# Patient Record
Sex: MALE | Race: WHITE | Employment: OTHER | URBAN - METROPOLITAN AREA
[De-identification: names, ages, dates, MRNs, and addresses within clinical notes are randomized per-mention and may not be internally consistent; named-entity substitution may affect disease eponyms.]

---

## 2018-04-13 ENCOUNTER — NURSE ONLY (OUTPATIENT)
Dept: WOUND CARE | Facility: HOSPITAL | Age: 83
End: 2018-04-13
Attending: NURSE PRACTITIONER
Payer: MEDICARE

## 2018-04-13 DIAGNOSIS — L97.421 ULCER OF LEFT HEEL, LIMITED TO BREAKDOWN OF SKIN (HCC): ICD-10-CM

## 2018-04-13 DIAGNOSIS — E11.621 ULCER OF FOOT DUE TO DIABETES MELLITUS (HCC): Primary | ICD-10-CM

## 2018-04-13 DIAGNOSIS — L97.401 DIABETIC ULCER OF MIDFOOT ASSOCIATED WITH DIABETES MELLITUS DUE TO UNDERLYING CONDITION, LIMITED TO BREAKDOWN OF SKIN, UNSPECIFIED LATERALITY (HCC): ICD-10-CM

## 2018-04-13 DIAGNOSIS — L97.411 ULCER OF RIGHT HEEL, LIMITED TO BREAKDOWN OF SKIN (HCC): ICD-10-CM

## 2018-04-13 DIAGNOSIS — E08.621 DIABETIC ULCER OF MIDFOOT ASSOCIATED WITH DIABETES MELLITUS DUE TO UNDERLYING CONDITION, LIMITED TO BREAKDOWN OF SKIN, UNSPECIFIED LATERALITY (HCC): ICD-10-CM

## 2018-04-13 DIAGNOSIS — L97.509 ULCER OF FOOT DUE TO DIABETES MELLITUS (HCC): Primary | ICD-10-CM

## 2018-04-13 PROCEDURE — 97597 DBRDMT OPN WND 1ST 20 CM/<: CPT

## 2018-04-13 PROCEDURE — 97597 DBRDMT OPN WND 1ST 20 CM/<: CPT | Performed by: NURSE PRACTITIONER

## 2018-04-13 PROCEDURE — 99204 OFFICE O/P NEW MOD 45 MIN: CPT | Performed by: NURSE PRACTITIONER

## 2018-04-13 NOTE — PROGRESS NOTES
Subjective    Chief Complaint  This information was obtained from the patient  The patient is new to the [address] [facility],[address] here for an initial visit for the evaluation and management of non-healing wound(s).     Allergies  None    HPI  This information was Avery Martin Constitutional Symptoms (General Health): Chills, Fatigue, Fever, Loss of Appetite  Respiratory: Cough, Shortness of Breath  Cardiovascular (Central/Peripheral): Dyspnea on Exertion  Integumentary (Hair/Skin/Nails): Calluses/Corns    Additional Information ascorbic acid (vitamin C) 500 mg tablet oral tablet oral once daily        Objective    Constitutional  obese. well developed, well nourished, no acute distress.  Height/Length: 65 in (165.1 cm), Weight: 207 lbs (94.09 kgs), BMI: 34.4, Temperature: 97.2 °F Wound #2 Left, Lateral Ankle is an acute Evans Grade 1 Diabetic Ulcer and has received a status of Not Healed. Initial wound encounter measurements are 0.5cm length x 0.3cm width x 0.1cm depth, with an area of 0.15 sq cm and a volume of 0.015 cubic cm.  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 A selective debridement with a total area debrided of 2.88 sq cm was performed by IVY Lane. Dermis was removed along with devitalized tissue: biofilm and callus.  The following instrument(s) w Wound #3 (Diabetic Ulcer) is located on the right heel. A selective debridement with a total area debrided of 5.76 sq cm was performed by Francois Gomez RN. Dermis was removed along with devitalized tissue: biofilm and callus.  The following instrument(s) A follow-up appointment should be scheduled.     Electronic Signature(s)  Signed By: Date:  Buck Mcmillan FNP-BC 04/13/2018 5:18:03 PM     4/13/2018 11:59:55 AM Version Signed By: Date:  Ambar Gonzalez 4/13/2018 12:00:11 PM    Entered By: Buck Mcmillan on 0 Compression used: using Artiflex 10 cm (1), Artiflex 15 cm (1), Comprilan 08 cm (1), Comprilan 10 cm (1)  Wound #3 (Right Heel)  . Wound Treatment Note  Assessed patient’s pain status and effectiveness of pain management plan.   Limb cleansed using Betasept Performed By: Clinician Blanche Nettles RN  Debridement: Selective  Photos  Photo  Time-Out Taken: Yes  Post Debridement Measurements  Length: (cm) 0.5  Width: (cm) 0.3  Depth: (cm) 0.1      Area: (cm²) 0.15  Percent Debrided: 100  Total Area Debrided: (sq

## 2018-04-17 ENCOUNTER — HOSPITAL ENCOUNTER (OUTPATIENT)
Dept: GENERAL RADIOLOGY | Facility: HOSPITAL | Age: 83
Discharge: HOME | End: 2018-04-17
Attending: NURSE PRACTITIONER
Payer: MEDICARE

## 2018-04-17 ENCOUNTER — NURSE ONLY (OUTPATIENT)
Dept: WOUND CARE | Facility: HOSPITAL | Age: 83
End: 2018-04-17
Attending: NURSE PRACTITIONER
Payer: MEDICARE

## 2018-04-17 DIAGNOSIS — S91.302D OPEN WOUND OF LEFT FOOT EXCLUDING ONE OR MORE TOES, SUBSEQUENT ENCOUNTER: ICD-10-CM

## 2018-04-17 DIAGNOSIS — S91.302D OPEN WOUND OF LEFT FOOT EXCLUDING ONE OR MORE TOES, SUBSEQUENT ENCOUNTER: Primary | ICD-10-CM

## 2018-04-17 PROBLEM — S91.302A: Status: ACTIVE | Noted: 2018-04-17

## 2018-04-17 PROCEDURE — 73630 X-RAY EXAM OF FOOT: CPT | Performed by: NURSE PRACTITIONER

## 2018-04-17 PROCEDURE — 99212 OFFICE O/P EST SF 10 MIN: CPT | Performed by: NURSE PRACTITIONER

## 2018-04-17 PROCEDURE — 99212 OFFICE O/P EST SF 10 MIN: CPT

## 2018-04-17 NOTE — PROGRESS NOTES
x-ray result discussed with patient and faxed to primary office 4/17/2018. PROCEDURE:  XR FOOT, COMPLETE (MIN 3 VIEWS), LEFT (CPT=73630)     COMPARISON: None. INDICATIONS:  Unspecified open wound, of left foot.      TECHNIQUE:     3 views were obtained

## 2018-04-17 NOTE — PROGRESS NOTES
Header Image    Progress Note Details  Patient Name: Rick Franco  Patient Number: [id number]  Patient YOB: 1935  Date: 4/17/2018  RN: Humphrey Campbell CNA/JULIANNE/CMA: Marifer Retana  Physician / Irasema Silverio: Antonina David: Outpatient Wound #1 Left Heel is an acute Diabetic Ulcer and has received an outcome of Resolved. The periwound skin exhibited: Dry/Scaly.  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 Increase dietary protein intake. Exercise as tolerated. Decrease salt intake. Moisturizer.  - Continue to use Urea 20% on the unwrapped right leg  Other: - reduced calorie, low carbohydrate diet    Follow-Up Ray

## 2018-04-20 ENCOUNTER — NURSE ONLY (OUTPATIENT)
Dept: WOUND CARE | Facility: HOSPITAL | Age: 83
End: 2018-04-20
Attending: NURSE PRACTITIONER
Payer: MEDICARE

## 2018-04-20 DIAGNOSIS — L97.411 ULCER OF RIGHT HEEL, LIMITED TO BREAKDOWN OF SKIN (HCC): Primary | ICD-10-CM

## 2018-04-20 PROCEDURE — 99212 OFFICE O/P EST SF 10 MIN: CPT

## 2018-04-24 ENCOUNTER — NURSE ONLY (OUTPATIENT)
Dept: WOUND CARE | Facility: HOSPITAL | Age: 83
End: 2018-04-24
Attending: NURSE PRACTITIONER
Payer: MEDICARE

## 2018-04-24 DIAGNOSIS — L97.411 ULCER OF RIGHT HEEL, LIMITED TO BREAKDOWN OF SKIN (HCC): Primary | ICD-10-CM

## 2018-04-24 DIAGNOSIS — L97.421 ULCER OF LEFT HEEL, LIMITED TO BREAKDOWN OF SKIN (HCC): ICD-10-CM

## 2018-04-24 PROCEDURE — 99211 OFF/OP EST MAY X REQ PHY/QHP: CPT | Performed by: NURSE PRACTITIONER

## 2018-04-24 PROCEDURE — 99211 OFF/OP EST MAY X REQ PHY/QHP: CPT

## 2018-04-24 NOTE — PROGRESS NOTES
Chief Complaint  This information was obtained from the patient  The patient is new to the [address] [facility],[address] here for an initial visit for the evaluation and management of non-healing wound(s). \  4/24/18 no concerns for today visit    Allergies  None    HPI  Th skin  S91.002A: Unspecified open wound, left ankle, initial encounter        Patient was seen by PCP at [place] for left 2nd toe erythema. Patient's bilateral heel wounds remain  healed.  Patient has complain of lost of sensation and never pain on the hands and stocking, knee high (1)  Notes  callex cream  Wound #3 (Right Heel)  . Wound Treatment Note  Assessed patient’s pain status and effectiveness of pain management plan.   Limb cleansed using Betasept and water (1)  Cleansed wound and periwound with non-cytotox

## 2018-04-24 NOTE — PROGRESS NOTES
Chief Complaint  This information was obtained from the patient  The patient is new to the [address] [facility],[address] here for an initial visit for the evaluation and management of non-healing wound(s).     Allergies  None    HPI  This information was obtained from the p Constitutional Symptoms (General Health): Chills, Fatigue, Fever, Loss of Appetite  Respiratory: Cough, Shortness of Breath  Cardiovascular (Central/Peripheral): Dyspnea on Exertion  Integumentary (Hair/Skin/Nails): Calluses/Corns    Additional Information ascorbic acid (vitamin C) 500 mg tablet oral tablet oral once daily        Objective    Constitutional  obese. well developed, well nourished, no acute distress.  Height/Length: 65 in (165.1 cm), Weight: 207 lbs (94.09 kgs), BMI: 34.4, Temperature: 97.2 °F Wound #2 Left, Lateral Ankle is an acute Evans Grade 1 Diabetic Ulcer and has received a status of Not Healed. Initial wound encounter measurements are 0.5cm length x 0.3cm width x 0.1cm depth, with an area of 0.15 sq cm and a volume of 0.015 cubic cm.  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 A selective debridement with a total area debrided of 2.88 sq cm was performed by IVY Trinidad. Dermis was removed along with devitalized tissue: biofilm and callus.  The following instrument(s) w Wound #3 (Diabetic Ulcer) is located on the right heel. A selective debridement with a total area debrided of 5.76 sq cm was performed by IVY Lopez. Dermis was removed along with devitalized tissue: biofilm and callus.  The following instrument(s) A follow-up appointment should be scheduled.           Electronic Signature(s)  Signed By: Salud Nguyen 04/24/2018 6:57:11 AM     4/13/2018 5:17:49 PM Version Signed By: Brian Arroyo 4/13/2018 5:18:03 PM  Ana Laura  4/20/2018 6:59:53 AM Compression applied to: using Toe bases to tibial tubercle (1)  Compression used: using Artiflex 10 cm (1), Artiflex 15 cm (1), Comprilan 08 cm (1), Comprilan 10 cm (1)  Wound #3 (Right Heel)    . Wound Treatment Note  Assessed patient’s pain status and eff Physician / Extender: Adrienne Duque: Outpatient  Debridement Performed for Assessment: Wound #2 Left, Lateral Ankle  Performed By: Clinician IVY Carrillo  Debridement: Selective  Photos  Photo  Time-Out Taken: Yes  Post Debridement Measurem Nevaeh Paul 4/13/2018 12:00:11 PM  Fadia Chan 4/13/2018 5:18:03 PM    Entered By: Nevaeh Paul on 04/24/2018 6:56:38 AM

## 2018-04-27 ENCOUNTER — APPOINTMENT (OUTPATIENT)
Dept: WOUND CARE | Facility: HOSPITAL | Age: 83
End: 2018-04-27
Attending: NURSE PRACTITIONER
Payer: MEDICARE

## 2018-05-01 ENCOUNTER — APPOINTMENT (OUTPATIENT)
Dept: WOUND CARE | Facility: HOSPITAL | Age: 83
End: 2018-05-01
Attending: NURSE PRACTITIONER
Payer: MEDICARE

## 2018-05-04 ENCOUNTER — APPOINTMENT (OUTPATIENT)
Dept: WOUND CARE | Facility: HOSPITAL | Age: 83
End: 2018-05-04
Attending: NURSE PRACTITIONER
Payer: MEDICARE

## 2018-05-08 ENCOUNTER — APPOINTMENT (OUTPATIENT)
Dept: WOUND CARE | Facility: HOSPITAL | Age: 83
End: 2018-05-08
Attending: NURSE PRACTITIONER
Payer: MEDICARE

## 2018-10-27 ENCOUNTER — HOSPITAL ENCOUNTER (EMERGENCY)
Facility: HOSPITAL | Age: 83
Discharge: HOME | End: 2018-10-27
Attending: EMERGENCY MEDICINE
Payer: MEDICARE

## 2018-10-27 VITALS
OXYGEN SATURATION: 98 % | TEMPERATURE: 98 F | DIASTOLIC BLOOD PRESSURE: 50 MMHG | RESPIRATION RATE: 16 BRPM | HEART RATE: 69 BPM | SYSTOLIC BLOOD PRESSURE: 153 MMHG

## 2018-10-27 DIAGNOSIS — T14.8XXA SKIN AVULSION: Primary | ICD-10-CM

## 2018-10-27 DIAGNOSIS — S91.112A LACERATION OF GREAT TOE OF LEFT FOOT, FOREIGN BODY PRESENCE UNSPECIFIED, NAIL DAMAGE STATUS UNSPECIFIED, INITIAL ENCOUNTER: ICD-10-CM

## 2018-10-27 DIAGNOSIS — E10.65 TYPE 1 DIABETES MELLITUS WITH HYPERGLYCEMIA (HCC): ICD-10-CM

## 2018-10-27 PROCEDURE — 99283 EMERGENCY DEPT VISIT LOW MDM: CPT

## 2018-10-27 PROCEDURE — 82962 GLUCOSE BLOOD TEST: CPT

## 2018-10-27 RX ORDER — CEPHALEXIN 500 MG/1
500 CAPSULE ORAL 2 TIMES DAILY
Qty: 10 CAPSULE | Refills: 0 | Status: SHIPPED | OUTPATIENT
Start: 2018-10-27 | End: 2018-11-01

## 2018-10-28 NOTE — ED PROVIDER NOTES
Patient Seen in: [facility] AND [facility] Emergency Department    History   Patient presents with:  Rash Skin Problem (integumentary)    Stated Complaint: foot injury    HPI    HPI: Sylwia Harden is a 81year old male who presents after an injury to b feet   th minimal surrounding erythema, left foot great toe at the base lateral plantar surface there is a 1 cm area of ecchymosis then there is a superficial laceration just proximal to this. .  2+ distal pulses. NEURO:Sensation to touch is intact.   SKIN: Janeen Fall

## 2018-10-28 NOTE — ED NOTES
Pt has diabetes with peripheral neuropathy and was walking in stocking feet through the kitchen tonight when he noted some blood on his left sock. He has a small bruise noted on plantar surface of left great toe, with no current bleeding noted.   On the do

## 2020-01-01 ENCOUNTER — APPOINTMENT (OUTPATIENT)
Dept: ULTRASOUND IMAGING | Facility: HOSPITAL | Age: 85
DRG: 871 | End: 2020-01-01
Attending: NURSE PRACTITIONER
Payer: MEDICARE

## 2020-01-01 ENCOUNTER — APPOINTMENT (OUTPATIENT)
Dept: GENERAL RADIOLOGY | Facility: HOSPITAL | Age: 85
DRG: 871 | End: 2020-01-01
Attending: INTERNAL MEDICINE
Payer: MEDICARE

## 2020-01-01 ENCOUNTER — ANESTHESIA (OUTPATIENT)
Dept: SURGERY | Facility: HOSPITAL | Age: 85
End: 2020-01-01
Payer: MEDICARE

## 2020-01-01 ENCOUNTER — HOSPITAL ENCOUNTER (OUTPATIENT)
Facility: HOSPITAL | Age: 85
Setting detail: OBSERVATION
Discharge: HOME HEALTH | End: 2020-01-01
Attending: EMERGENCY MEDICINE | Admitting: HOSPITALIST
Payer: MEDICARE

## 2020-01-01 ENCOUNTER — HOSPITAL ENCOUNTER (INPATIENT)
Facility: HOSPITAL | Age: 85
LOS: 1 days | DRG: 871 | End: 2020-01-01
Attending: EMERGENCY MEDICINE | Admitting: HOSPITALIST
Payer: MEDICARE

## 2020-01-01 ENCOUNTER — ANESTHESIA EVENT (OUTPATIENT)
Dept: SURGERY | Facility: HOSPITAL | Age: 85
End: 2020-01-01
Payer: MEDICARE

## 2020-01-01 ENCOUNTER — APPOINTMENT (OUTPATIENT)
Dept: ULTRASOUND IMAGING | Facility: HOSPITAL | Age: 85
DRG: 871 | End: 2020-01-01
Attending: EMERGENCY MEDICINE
Payer: MEDICARE

## 2020-01-01 ENCOUNTER — HOSPITAL ENCOUNTER (EMERGENCY)
Facility: HOSPITAL | Age: 85
Discharge: HOME | End: 2020-01-01
Attending: EMERGENCY MEDICINE
Payer: MEDICARE

## 2020-01-01 ENCOUNTER — APPOINTMENT (OUTPATIENT)
Dept: GENERAL RADIOLOGY | Facility: HOSPITAL | Age: 85
DRG: 871 | End: 2020-01-01
Attending: HOSPITALIST
Payer: MEDICARE

## 2020-01-01 ENCOUNTER — APPOINTMENT (OUTPATIENT)
Dept: CV DIAGNOSTICS | Facility: HOSPITAL | Age: 85
DRG: 871 | End: 2020-01-01
Attending: INTERNAL MEDICINE
Payer: MEDICARE

## 2020-01-01 ENCOUNTER — APPOINTMENT (OUTPATIENT)
Dept: GENERAL RADIOLOGY | Facility: HOSPITAL | Age: 85
DRG: 871 | End: 2020-01-01
Attending: EMERGENCY MEDICINE
Payer: MEDICARE

## 2020-01-01 VITALS
SYSTOLIC BLOOD PRESSURE: 128 MMHG | RESPIRATION RATE: 20 BRPM | TEMPERATURE: 98 F | HEART RATE: 60 BPM | HEIGHT: 67 IN | OXYGEN SATURATION: 97 % | WEIGHT: 185 LBS | BODY MASS INDEX: 29.03 KG/M2 | DIASTOLIC BLOOD PRESSURE: 66 MMHG

## 2020-01-01 VITALS
RESPIRATION RATE: 16 BRPM | OXYGEN SATURATION: 97 % | HEIGHT: 67 IN | SYSTOLIC BLOOD PRESSURE: 194 MMHG | BODY MASS INDEX: 28.25 KG/M2 | WEIGHT: 180 LBS | HEART RATE: 67 BPM | TEMPERATURE: 99 F | DIASTOLIC BLOOD PRESSURE: 78 MMHG

## 2020-01-01 VITALS
DIASTOLIC BLOOD PRESSURE: 29 MMHG | SYSTOLIC BLOOD PRESSURE: 51 MMHG | WEIGHT: 211.88 LBS | BODY MASS INDEX: 33.26 KG/M2 | TEMPERATURE: 98 F | HEIGHT: 67 IN

## 2020-01-01 DIAGNOSIS — A41.9 SEPSIS, DUE TO UNSPECIFIED ORGANISM, UNSPECIFIED WHETHER ACUTE ORGAN DYSFUNCTION PRESENT (HCC): ICD-10-CM

## 2020-01-01 DIAGNOSIS — N17.9 AKI (ACUTE KIDNEY INJURY) (HCC): ICD-10-CM

## 2020-01-01 DIAGNOSIS — E87.5 HYPERKALEMIA: ICD-10-CM

## 2020-01-01 DIAGNOSIS — N30.00 ACUTE CYSTITIS WITHOUT HEMATURIA: Primary | ICD-10-CM

## 2020-01-01 DIAGNOSIS — R04.0 EPISTAXIS: Primary | ICD-10-CM

## 2020-01-01 DIAGNOSIS — R04.0 ACUTE POSTERIOR EPISTAXIS: Primary | ICD-10-CM

## 2020-01-01 LAB
ALBUMIN SERPL-MCNC: 2.4 G/DL (ref 3.4–5)
ALBUMIN SERPL-MCNC: 2.4 G/DL (ref 3.4–5)
ALBUMIN SERPL-MCNC: 2.6 G/DL (ref 3.4–5)
ALBUMIN SERPL-MCNC: 2.8 G/DL (ref 3.4–5)
ALBUMIN SERPL-MCNC: 2.8 G/DL (ref 3.4–5)
ALBUMIN SERPL-MCNC: 3 G/DL (ref 3.4–5)
ALBUMIN/GLOB SERPL: 0.7 {RATIO} (ref 1–2)
ALP LIVER SERPL-CCNC: 107 U/L (ref 45–117)
ALP LIVER SERPL-CCNC: 133 U/L (ref 45–117)
ALP LIVER SERPL-CCNC: 141 U/L (ref 45–117)
ALT SERPL-CCNC: 1370 U/L (ref 16–61)
ALT SERPL-CCNC: 1590 U/L (ref 16–61)
ALT SERPL-CCNC: 196 U/L (ref 16–61)
AMMONIA PLAS-MCNC: 76 UMOL/L (ref 11–32)
AMPHET UR QL SCN: NEGATIVE
ANION GAP SERPL CALC-SCNC: 11 MMOL/L (ref 0–18)
ANION GAP SERPL CALC-SCNC: 13 MMOL/L (ref 0–18)
ANION GAP SERPL CALC-SCNC: 16 MMOL/L (ref 0–18)
ANION GAP SERPL CALC-SCNC: 16 MMOL/L (ref 0–18)
ANION GAP SERPL CALC-SCNC: 18 MMOL/L (ref 0–18)
ANION GAP SERPL CALC-SCNC: 2 MMOL/L (ref 0–18)
ANION GAP SERPL CALC-SCNC: 4 MMOL/L (ref 0–18)
ANION GAP SERPL CALC-SCNC: 6 MMOL/L (ref 0–18)
ANION GAP SERPL CALC-SCNC: 7 MMOL/L (ref 0–18)
ANION GAP SERPL CALC-SCNC: 8 MMOL/L (ref 0–18)
ANION GAP SERPL CALC-SCNC: 9 MMOL/L (ref 0–18)
APTT PPP: 32.6 SECONDS (ref 23.2–35.3)
APTT PPP: 34.9 SECONDS (ref 23.2–35.3)
APTT PPP: 38.8 SECONDS (ref 23.2–35.3)
AST SERPL-CCNC: 2326 U/L (ref 15–37)
AST SERPL-CCNC: 249 U/L (ref 15–37)
AST SERPL-CCNC: 3276 U/L (ref 15–37)
BARBITURATES UR QL SCN: NEGATIVE
BASE EXCESS BLD CALC-SCNC: -10.8 MMOL/L (ref ?–2)
BASE EXCESS BLD CALC-SCNC: -15.1 MMOL/L (ref ?–2)
BASOPHILS # BLD AUTO: 0.03 X10(3) UL (ref 0–0.2)
BASOPHILS # BLD AUTO: 0.04 X10(3) UL (ref 0–0.2)
BASOPHILS # BLD AUTO: 0.05 X10(3) UL (ref 0–0.2)
BASOPHILS # BLD AUTO: 0.05 X10(3) UL (ref 0–0.2)
BASOPHILS # BLD AUTO: 0.07 X10(3) UL (ref 0–0.2)
BASOPHILS # BLD: 0 X10(3) UL (ref 0–0.2)
BASOPHILS # BLD: 0 X10(3) UL (ref 0–0.2)
BASOPHILS NFR BLD AUTO: 0.1 %
BASOPHILS NFR BLD AUTO: 0.3 %
BASOPHILS NFR BLD AUTO: 0.3 %
BASOPHILS NFR BLD AUTO: 0.4 %
BASOPHILS NFR BLD AUTO: 0.7 %
BASOPHILS NFR BLD AUTO: 0.7 %
BASOPHILS NFR BLD AUTO: 0.9 %
BASOPHILS NFR BLD: 0 %
BASOPHILS NFR BLD: 0 %
BENZODIAZ UR QL SCN: NEGATIVE
BILIRUB SERPL-MCNC: 0.5 MG/DL (ref 0.1–2)
BILIRUB SERPL-MCNC: 0.8 MG/DL (ref 0.1–2)
BILIRUB SERPL-MCNC: 0.8 MG/DL (ref 0.1–2)
BILIRUB UR QL: NEGATIVE
BUN BLD-MCNC: 19 MG/DL (ref 7–18)
BUN BLD-MCNC: 30 MG/DL (ref 7–18)
BUN BLD-MCNC: 31 MG/DL (ref 7–18)
BUN BLD-MCNC: 36 MG/DL (ref 7–18)
BUN BLD-MCNC: 43 MG/DL (ref 7–18)
BUN BLD-MCNC: 45 MG/DL (ref 7–18)
BUN BLD-MCNC: 47 MG/DL (ref 7–18)
BUN BLD-MCNC: 54 MG/DL (ref 7–18)
BUN BLD-MCNC: 56 MG/DL (ref 7–18)
BUN BLD-MCNC: 60 MG/DL (ref 7–18)
BUN BLD-MCNC: 60 MG/DL (ref 7–18)
BUN/CREAT SERPL: 15.2 (ref 10–20)
BUN/CREAT SERPL: 16 (ref 10–20)
BUN/CREAT SERPL: 16.4 (ref 10–20)
BUN/CREAT SERPL: 16.7 (ref 10–20)
BUN/CREAT SERPL: 16.7 (ref 10–20)
BUN/CREAT SERPL: 17 (ref 10–20)
BUN/CREAT SERPL: 17.3 (ref 10–20)
BUN/CREAT SERPL: 17.3 (ref 10–20)
BUN/CREAT SERPL: 23.7 (ref 10–20)
BUN/CREAT SERPL: 26.1 (ref 10–20)
BUN/CREAT SERPL: 29.8 (ref 10–20)
CA-I BLD-SCNC: 1.06 MMOL/L (ref 0.95–1.32)
CALCIUM BLD-MCNC: 7.6 MG/DL (ref 8.5–10.1)
CALCIUM BLD-MCNC: 7.7 MG/DL (ref 8.5–10.1)
CALCIUM BLD-MCNC: 7.7 MG/DL (ref 8.5–10.1)
CALCIUM BLD-MCNC: 7.9 MG/DL (ref 8.5–10.1)
CALCIUM BLD-MCNC: 8 MG/DL (ref 8.5–10.1)
CALCIUM BLD-MCNC: 8.2 MG/DL (ref 8.5–10.1)
CALCIUM BLD-MCNC: 8.3 MG/DL (ref 8.5–10.1)
CALCIUM BLD-MCNC: 8.3 MG/DL (ref 8.5–10.1)
CALCIUM BLD-MCNC: 8.6 MG/DL (ref 8.5–10.1)
CALCIUM BLD-MCNC: 8.9 MG/DL (ref 8.5–10.1)
CALCIUM BLD-MCNC: 9.2 MG/DL (ref 8.5–10.1)
CANNABINOIDS UR QL SCN: NEGATIVE
CHLORIDE SERPL-SCNC: 109 MMOL/L (ref 98–112)
CHLORIDE SERPL-SCNC: 110 MMOL/L (ref 98–112)
CHLORIDE SERPL-SCNC: 112 MMOL/L (ref 98–112)
CHLORIDE SERPL-SCNC: 113 MMOL/L (ref 98–112)
CHLORIDE SERPL-SCNC: 113 MMOL/L (ref 98–112)
CHLORIDE SERPL-SCNC: 114 MMOL/L (ref 98–112)
CHLORIDE SERPL-SCNC: 115 MMOL/L (ref 98–112)
CLARITY UR: CLEAR
CO2 SERPL-SCNC: 13 MMOL/L (ref 21–32)
CO2 SERPL-SCNC: 15 MMOL/L (ref 21–32)
CO2 SERPL-SCNC: 16 MMOL/L (ref 21–32)
CO2 SERPL-SCNC: 16 MMOL/L (ref 21–32)
CO2 SERPL-SCNC: 17 MMOL/L (ref 21–32)
CO2 SERPL-SCNC: 18 MMOL/L (ref 21–32)
CO2 SERPL-SCNC: 22 MMOL/L (ref 21–32)
CO2 SERPL-SCNC: 24 MMOL/L (ref 21–32)
CO2 SERPL-SCNC: 26 MMOL/L (ref 21–32)
CO2 SERPL-SCNC: 27 MMOL/L (ref 21–32)
CO2 SERPL-SCNC: 29 MMOL/L (ref 21–32)
COCAINE UR QL: NEGATIVE
COHGB MFR BLD: 1.7 % (ref 0–1.5)
COLOR UR: YELLOW
CREAT BLD-MCNC: 1.15 MG/DL (ref 0.7–1.3)
CREAT BLD-MCNC: 1.19 MG/DL (ref 0.7–1.3)
CREAT BLD-MCNC: 1.21 MG/DL (ref 0.7–1.3)
CREAT BLD-MCNC: 1.31 MG/DL (ref 0.7–1.3)
CREAT BLD-MCNC: 2.49 MG/DL (ref 0.7–1.3)
CREAT BLD-MCNC: 2.75 MG/DL (ref 0.7–1.3)
CREAT BLD-MCNC: 2.77 MG/DL (ref 0.7–1.3)
CREAT BLD-MCNC: 3.12 MG/DL (ref 0.7–1.3)
CREAT BLD-MCNC: 3.59 MG/DL (ref 0.7–1.3)
CREAT BLD-MCNC: 3.59 MG/DL (ref 0.7–1.3)
CREAT BLD-MCNC: 3.69 MG/DL (ref 0.7–1.3)
CREAT UR-SCNC: 208 MG/DL
DEPRECATED HBV CORE AB SER IA-ACNC: 91 NG/ML (ref 30–530)
DEPRECATED RDW RBC AUTO: 58 FL (ref 35.1–46.3)
DEPRECATED RDW RBC AUTO: 58.9 FL (ref 35.1–46.3)
DEPRECATED RDW RBC AUTO: 59.9 FL (ref 35.1–46.3)
DEPRECATED RDW RBC AUTO: 61.6 FL (ref 35.1–46.3)
DEPRECATED RDW RBC AUTO: 62.8 FL (ref 35.1–46.3)
DEPRECATED RDW RBC AUTO: 64 FL (ref 35.1–46.3)
DEPRECATED RDW RBC AUTO: 64.5 FL (ref 35.1–46.3)
EOSINOPHIL # BLD AUTO: 0 X10(3) UL (ref 0–0.7)
EOSINOPHIL # BLD AUTO: 0.03 X10(3) UL (ref 0–0.7)
EOSINOPHIL # BLD AUTO: 0.07 X10(3) UL (ref 0–0.7)
EOSINOPHIL # BLD AUTO: 0.08 X10(3) UL (ref 0–0.7)
EOSINOPHIL # BLD AUTO: 0.09 X10(3) UL (ref 0–0.7)
EOSINOPHIL # BLD AUTO: 0.17 X10(3) UL (ref 0–0.7)
EOSINOPHIL # BLD AUTO: 0.19 X10(3) UL (ref 0–0.7)
EOSINOPHIL # BLD: 0 X10(3) UL (ref 0–0.7)
EOSINOPHIL # BLD: 0 X10(3) UL (ref 0–0.7)
EOSINOPHIL NFR BLD AUTO: 0 %
EOSINOPHIL NFR BLD AUTO: 0.3 %
EOSINOPHIL NFR BLD AUTO: 0.8 %
EOSINOPHIL NFR BLD AUTO: 0.9 %
EOSINOPHIL NFR BLD AUTO: 1.1 %
EOSINOPHIL NFR BLD AUTO: 2.4 %
EOSINOPHIL NFR BLD AUTO: 2.8 %
EOSINOPHIL NFR BLD: 0 %
EOSINOPHIL NFR BLD: 0 %
ERYTHROCYTE [DISTWIDTH] IN BLOOD BY AUTOMATED COUNT: 17.1 % (ref 11–15)
ERYTHROCYTE [DISTWIDTH] IN BLOOD BY AUTOMATED COUNT: 17.2 % (ref 11–15)
ERYTHROCYTE [DISTWIDTH] IN BLOOD BY AUTOMATED COUNT: 17.2 % (ref 11–15)
ERYTHROCYTE [DISTWIDTH] IN BLOOD BY AUTOMATED COUNT: 17.3 % (ref 11–15)
ERYTHROCYTE [DISTWIDTH] IN BLOOD BY AUTOMATED COUNT: 17.4 % (ref 11–15)
ERYTHROCYTE [DISTWIDTH] IN BLOOD BY AUTOMATED COUNT: 17.6 % (ref 11–15)
ERYTHROCYTE [DISTWIDTH] IN BLOOD BY AUTOMATED COUNT: 17.7 % (ref 11–15)
EST. AVERAGE GLUCOSE BLD GHB EST-MCNC: 114 MG/DL (ref 68–126)
GLOBULIN PLAS-MCNC: 3.6 G/DL (ref 2.8–4.4)
GLOBULIN PLAS-MCNC: 4.2 G/DL (ref 2.8–4.4)
GLOBULIN PLAS-MCNC: 4.4 G/DL (ref 2.8–4.4)
GLUCOSE BLD-MCNC: 108 MG/DL (ref 70–99)
GLUCOSE BLD-MCNC: 109 MG/DL (ref 70–99)
GLUCOSE BLD-MCNC: 110 MG/DL (ref 70–99)
GLUCOSE BLD-MCNC: 114 MG/DL (ref 70–99)
GLUCOSE BLD-MCNC: 116 MG/DL (ref 70–99)
GLUCOSE BLD-MCNC: 131 MG/DL (ref 70–99)
GLUCOSE BLD-MCNC: 134 MG/DL (ref 70–99)
GLUCOSE BLD-MCNC: 44 MG/DL (ref 70–99)
GLUCOSE BLD-MCNC: 84 MG/DL (ref 70–99)
GLUCOSE BLD-MCNC: 96 MG/DL (ref 70–99)
GLUCOSE BLD-MCNC: 96 MG/DL (ref 70–99)
GLUCOSE BLDC GLUCOMTR-MCNC: 106 MG/DL (ref 70–99)
GLUCOSE BLDC GLUCOMTR-MCNC: 107 MG/DL (ref 70–99)
GLUCOSE BLDC GLUCOMTR-MCNC: 108 MG/DL (ref 70–99)
GLUCOSE BLDC GLUCOMTR-MCNC: 111 MG/DL (ref 70–99)
GLUCOSE BLDC GLUCOMTR-MCNC: 112 MG/DL (ref 70–99)
GLUCOSE BLDC GLUCOMTR-MCNC: 115 MG/DL (ref 70–99)
GLUCOSE BLDC GLUCOMTR-MCNC: 117 MG/DL (ref 70–99)
GLUCOSE BLDC GLUCOMTR-MCNC: 118 MG/DL (ref 70–99)
GLUCOSE BLDC GLUCOMTR-MCNC: 118 MG/DL (ref 70–99)
GLUCOSE BLDC GLUCOMTR-MCNC: 120 MG/DL (ref 70–99)
GLUCOSE BLDC GLUCOMTR-MCNC: 121 MG/DL (ref 70–99)
GLUCOSE BLDC GLUCOMTR-MCNC: 122 MG/DL (ref 70–99)
GLUCOSE BLDC GLUCOMTR-MCNC: 123 MG/DL (ref 70–99)
GLUCOSE BLDC GLUCOMTR-MCNC: 125 MG/DL (ref 70–99)
GLUCOSE BLDC GLUCOMTR-MCNC: 126 MG/DL (ref 70–99)
GLUCOSE BLDC GLUCOMTR-MCNC: 127 MG/DL (ref 70–99)
GLUCOSE BLDC GLUCOMTR-MCNC: 128 MG/DL (ref 70–99)
GLUCOSE BLDC GLUCOMTR-MCNC: 129 MG/DL (ref 70–99)
GLUCOSE BLDC GLUCOMTR-MCNC: 129 MG/DL (ref 70–99)
GLUCOSE BLDC GLUCOMTR-MCNC: 130 MG/DL (ref 70–99)
GLUCOSE BLDC GLUCOMTR-MCNC: 134 MG/DL (ref 70–99)
GLUCOSE BLDC GLUCOMTR-MCNC: 135 MG/DL (ref 70–99)
GLUCOSE BLDC GLUCOMTR-MCNC: 135 MG/DL (ref 70–99)
GLUCOSE BLDC GLUCOMTR-MCNC: 140 MG/DL (ref 70–99)
GLUCOSE BLDC GLUCOMTR-MCNC: 144 MG/DL (ref 70–99)
GLUCOSE BLDC GLUCOMTR-MCNC: 145 MG/DL (ref 70–99)
GLUCOSE BLDC GLUCOMTR-MCNC: 146 MG/DL (ref 70–99)
GLUCOSE BLDC GLUCOMTR-MCNC: 148 MG/DL (ref 70–99)
GLUCOSE BLDC GLUCOMTR-MCNC: 151 MG/DL (ref 70–99)
GLUCOSE BLDC GLUCOMTR-MCNC: 159 MG/DL (ref 70–99)
GLUCOSE BLDC GLUCOMTR-MCNC: 167 MG/DL (ref 70–99)
GLUCOSE BLDC GLUCOMTR-MCNC: 180 MG/DL (ref 70–99)
GLUCOSE BLDC GLUCOMTR-MCNC: 64 MG/DL (ref 70–99)
GLUCOSE BLDC GLUCOMTR-MCNC: 73 MG/DL (ref 70–99)
GLUCOSE BLDC GLUCOMTR-MCNC: 81 MG/DL (ref 70–99)
GLUCOSE BLDC GLUCOMTR-MCNC: 82 MG/DL (ref 70–99)
GLUCOSE BLDC GLUCOMTR-MCNC: 87 MG/DL (ref 70–99)
GLUCOSE BLDC GLUCOMTR-MCNC: 88 MG/DL (ref 70–99)
GLUCOSE BLDC GLUCOMTR-MCNC: 88 MG/DL (ref 70–99)
GLUCOSE BLDC GLUCOMTR-MCNC: 90 MG/DL (ref 70–99)
GLUCOSE BLDC GLUCOMTR-MCNC: 95 MG/DL (ref 70–99)
GLUCOSE BLDC GLUCOMTR-MCNC: 96 MG/DL (ref 70–99)
GLUCOSE BLDC GLUCOMTR-MCNC: 97 MG/DL (ref 70–99)
GLUCOSE UR-MCNC: NEGATIVE MG/DL
HAV IGM SER QL: 2.7 MG/DL (ref 1.6–2.6)
HBA1C MFR BLD HPLC: 5.6 % (ref ?–5.7)
HCO3 BLDA-SCNC: 13.2 MEQ/L (ref 21–27)
HCO3 BLDA-SCNC: 16.5 MEQ/L (ref 21–27)
HCT VFR BLD AUTO: 27.1 % (ref 39–53)
HCT VFR BLD AUTO: 28.6 % (ref 39–53)
HCT VFR BLD AUTO: 28.9 % (ref 39–53)
HCT VFR BLD AUTO: 29.3 % (ref 39–53)
HCT VFR BLD AUTO: 31 % (ref 39–53)
HCT VFR BLD AUTO: 31.5 % (ref 39–53)
HCT VFR BLD AUTO: 32.5 % (ref 39–53)
HCT VFR BLD AUTO: 37.5 % (ref 39–53)
HCT VFR BLD AUTO: 38.8 % (ref 39–53)
HGB BLD-MCNC: 10.1 G/DL (ref 13–17.5)
HGB BLD-MCNC: 11.6 G/DL (ref 13–17.5)
HGB BLD-MCNC: 11.9 G/DL (ref 13–17.5)
HGB BLD-MCNC: 7.8 G/DL (ref 13–17.5)
HGB BLD-MCNC: 8.6 G/DL (ref 13–17.5)
HGB BLD-MCNC: 8.7 G/DL (ref 13–17.5)
HGB BLD-MCNC: 8.8 G/DL (ref 13–17.5)
HGB BLD-MCNC: 8.9 G/DL (ref 13–17.5)
HGB BLD-MCNC: 9 G/DL (ref 13–17.5)
HGB BLD-MCNC: 9.4 G/DL (ref 13.5–17.5)
HGB BLD-MCNC: 9.5 G/DL (ref 13–17.5)
HGB BLD-MCNC: 9.7 G/DL (ref 13–17.5)
HGB UR QL STRIP.AUTO: NEGATIVE
HGB UR QL STRIP.AUTO: NEGATIVE
HYALINE CASTS #/AREA URNS AUTO: 1 /LPF
IMM GRANULOCYTES # BLD AUTO: 0.02 X10(3) UL (ref 0–1)
IMM GRANULOCYTES # BLD AUTO: 0.02 X10(3) UL (ref 0–1)
IMM GRANULOCYTES # BLD AUTO: 0.03 X10(3) UL (ref 0–1)
IMM GRANULOCYTES # BLD AUTO: 0.04 X10(3) UL (ref 0–1)
IMM GRANULOCYTES # BLD AUTO: 0.38 X10(3) UL (ref 0–1)
IMM GRANULOCYTES NFR BLD: 0.2 %
IMM GRANULOCYTES NFR BLD: 0.3 %
IMM GRANULOCYTES NFR BLD: 0.4 %
IMM GRANULOCYTES NFR BLD: 0.5 %
IMM GRANULOCYTES NFR BLD: 1.2 %
INR BLD: 1.5 (ref 0.9–1.2)
INR BLD: 1.67 (ref 0.9–1.2)
INR BLD: 4 (ref 0.9–1.2)
IRON SATURATION: 6 % (ref 20–50)
IRON SERPL-MCNC: 17 UG/DL (ref 65–175)
KETONES UR-MCNC: NEGATIVE MG/DL
LACTATE SERPL-SCNC: 1.7 MMOL/L (ref 0.4–2)
LACTATE SERPL-SCNC: 10.7 MMOL/L (ref 0.4–2)
LACTATE SERPL-SCNC: 2.7 MMOL/L (ref 0.4–2)
LACTATE SERPL-SCNC: 3.4 MMOL/L (ref 0.4–2)
LACTATE SERPL-SCNC: 7.8 MMOL/L (ref 0.4–2)
LACTATE SERPL-SCNC: 9.6 MMOL/L (ref 0.4–2)
LACTIC ACID (BLOOD GAS): 10.6 MMOL/L (ref 0.5–2.2)
LYMPHOCYTES # BLD AUTO: 0.45 X10(3) UL (ref 1–4)
LYMPHOCYTES # BLD AUTO: 0.8 X10(3) UL (ref 1–4)
LYMPHOCYTES # BLD AUTO: 0.82 X10(3) UL (ref 1–4)
LYMPHOCYTES # BLD AUTO: 0.86 X10(3) UL (ref 1–4)
LYMPHOCYTES # BLD AUTO: 0.97 X10(3) UL (ref 1–4)
LYMPHOCYTES # BLD AUTO: 1.04 X10(3) UL (ref 1–4)
LYMPHOCYTES # BLD AUTO: 1.06 X10(3) UL (ref 1–4)
LYMPHOCYTES NFR BLD AUTO: 1.4 %
LYMPHOCYTES NFR BLD AUTO: 11.2 %
LYMPHOCYTES NFR BLD AUTO: 11.9 %
LYMPHOCYTES NFR BLD AUTO: 12.1 %
LYMPHOCYTES NFR BLD AUTO: 15.2 %
LYMPHOCYTES NFR BLD AUTO: 9.1 %
LYMPHOCYTES NFR BLD AUTO: 9.2 %
LYMPHOCYTES NFR BLD: 0.22 X10(3) UL (ref 1–4)
LYMPHOCYTES NFR BLD: 0.5 X10(3) UL (ref 1–4)
LYMPHOCYTES NFR BLD: 1 %
LYMPHOCYTES NFR BLD: 2 %
M PROTEIN MFR SERPL ELPH: 6 G/DL (ref 6.4–8.2)
M PROTEIN MFR SERPL ELPH: 7 G/DL (ref 6.4–8.2)
M PROTEIN MFR SERPL ELPH: 7.4 G/DL (ref 6.4–8.2)
MCH RBC QN AUTO: 28.4 PG (ref 26–34)
MCH RBC QN AUTO: 28.6 PG (ref 26–34)
MCH RBC QN AUTO: 28.6 PG (ref 26–34)
MCH RBC QN AUTO: 28.7 PG (ref 26–34)
MCH RBC QN AUTO: 29 PG (ref 26–34)
MCH RBC QN AUTO: 29.2 PG (ref 26–34)
MCH RBC QN AUTO: 29.3 PG (ref 26–34)
MCHC RBC AUTO-ENTMCNC: 28.4 G/DL (ref 31–37)
MCHC RBC AUTO-ENTMCNC: 28.8 G/DL (ref 31–37)
MCHC RBC AUTO-ENTMCNC: 29.2 G/DL (ref 31–37)
MCHC RBC AUTO-ENTMCNC: 29.7 G/DL (ref 31–37)
MCHC RBC AUTO-ENTMCNC: 30.7 G/DL (ref 31–37)
MCHC RBC AUTO-ENTMCNC: 30.8 G/DL (ref 31–37)
MCHC RBC AUTO-ENTMCNC: 30.9 G/DL (ref 31–37)
MCHC RBC AUTO-ENTMCNC: 31.1 G/DL (ref 31–37)
MCHC RBC AUTO-ENTMCNC: 31.1 G/DL (ref 31–37)
MCV RBC AUTO: 100 FL (ref 80–100)
MCV RBC AUTO: 92.4 FL (ref 80–100)
MCV RBC AUTO: 93.5 FL (ref 80–100)
MCV RBC AUTO: 93.8 FL (ref 80–100)
MCV RBC AUTO: 94 FL (ref 80–100)
MCV RBC AUTO: 94.1 FL (ref 80–100)
MCV RBC AUTO: 95.8 FL (ref 80–100)
MCV RBC AUTO: 97.9 FL (ref 80–100)
MCV RBC AUTO: 98.5 FL (ref 80–100)
MDMA UR QL SCN: NEGATIVE
METAMYELOCYTES # BLD: 0.22 X10(3) UL
METAMYELOCYTES NFR BLD: 1 %
METHADONE UR QL SCN: NEGATIVE
METHGB MFR BLD: 0.4 % SAT (ref 0.4–1.5)
MICROALBUMIN UR-MCNC: 182 MG/DL
MICROALBUMIN/CREAT 24H UR-RTO: 875 UG/MG (ref ?–30)
MODIFIED ALLEN TEST: POSITIVE
MODIFIED ALLEN TEST: POSITIVE
MONOCYTES # BLD AUTO: 0.69 X10(3) UL (ref 0.1–1)
MONOCYTES # BLD AUTO: 0.72 X10(3) UL (ref 0.1–1)
MONOCYTES # BLD AUTO: 0.74 X10(3) UL (ref 0.1–1)
MONOCYTES # BLD AUTO: 0.81 X10(3) UL (ref 0.1–1)
MONOCYTES # BLD AUTO: 0.99 X10(3) UL (ref 0.1–1)
MONOCYTES # BLD AUTO: 1.08 X10(3) UL (ref 0.1–1)
MONOCYTES # BLD AUTO: 2.94 X10(3) UL (ref 0.1–1)
MONOCYTES # BLD: 1.31 X10(3) UL (ref 0.1–1)
MONOCYTES # BLD: 1.74 X10(3) UL (ref 0.1–1)
MONOCYTES NFR BLD AUTO: 10.3 %
MONOCYTES NFR BLD AUTO: 11 %
MONOCYTES NFR BLD AUTO: 11.1 %
MONOCYTES NFR BLD AUTO: 11.6 %
MONOCYTES NFR BLD AUTO: 8.6 %
MONOCYTES NFR BLD AUTO: 8.6 %
MONOCYTES NFR BLD AUTO: 9.3 %
MONOCYTES NFR BLD: 6 %
MONOCYTES NFR BLD: 7 %
MORPHOLOGY: NORMAL
MORPHOLOGY: NORMAL
MRSA DNA SPEC QL NAA+PROBE: NEGATIVE
NEUTROPHILS # BLD AUTO: 18.49 X10 (3) UL (ref 1.5–7.7)
NEUTROPHILS # BLD AUTO: 21.71 X10 (3) UL (ref 1.5–7.7)
NEUTROPHILS # BLD AUTO: 27.98 X10 (3) UL (ref 1.5–7.7)
NEUTROPHILS # BLD AUTO: 27.98 X10(3) UL (ref 1.5–7.7)
NEUTROPHILS # BLD AUTO: 4.91 X10 (3) UL (ref 1.5–7.7)
NEUTROPHILS # BLD AUTO: 4.91 X10(3) UL (ref 1.5–7.7)
NEUTROPHILS # BLD AUTO: 4.97 X10 (3) UL (ref 1.5–7.7)
NEUTROPHILS # BLD AUTO: 4.97 X10(3) UL (ref 1.5–7.7)
NEUTROPHILS # BLD AUTO: 6.18 X10 (3) UL (ref 1.5–7.7)
NEUTROPHILS # BLD AUTO: 6.18 X10(3) UL (ref 1.5–7.7)
NEUTROPHILS # BLD AUTO: 6.93 X10 (3) UL (ref 1.5–7.7)
NEUTROPHILS # BLD AUTO: 6.93 X10(3) UL (ref 1.5–7.7)
NEUTROPHILS # BLD AUTO: 7.03 X10 (3) UL (ref 1.5–7.7)
NEUTROPHILS # BLD AUTO: 7.03 X10(3) UL (ref 1.5–7.7)
NEUTROPHILS # BLD AUTO: 7.65 X10 (3) UL (ref 1.5–7.7)
NEUTROPHILS # BLD AUTO: 7.65 X10(3) UL (ref 1.5–7.7)
NEUTROPHILS NFR BLD AUTO: 71.1 %
NEUTROPHILS NFR BLD AUTO: 73.2 %
NEUTROPHILS NFR BLD AUTO: 75.7 %
NEUTROPHILS NFR BLD AUTO: 77.1 %
NEUTROPHILS NFR BLD AUTO: 78.1 %
NEUTROPHILS NFR BLD AUTO: 81.3 %
NEUTROPHILS NFR BLD AUTO: 88 %
NEUTROPHILS NFR BLD: 83 %
NEUTROPHILS NFR BLD: 85 %
NEUTS BAND NFR BLD: 6 %
NEUTS BAND NFR BLD: 9 %
NEUTS HYPERSEG # BLD: 20.15 X10(3) UL (ref 1.5–7.7)
NEUTS HYPERSEG # BLD: 22.57 X10(3) UL (ref 1.5–7.7)
NITRITE UR QL STRIP.AUTO: NEGATIVE
NITRITE UR QL STRIP.AUTO: NEGATIVE
NITRITE UR QL STRIP.AUTO: POSITIVE
NT-PROBNP SERPL-MCNC: 9331 PG/ML (ref ?–450)
O2 CT BLD-SCNC: 12.5 VOL% (ref 15–23)
O2 CT BLD-SCNC: 13.2 VOL% (ref 15–23)
O2/TOTAL GAS SETTING VFR VENT: 100 %
OPIATES UR QL SCN: NEGATIVE
OSMOLALITY SERPL CALC.SUM OF ELEC: 297 MOSM/KG (ref 275–295)
OSMOLALITY SERPL CALC.SUM OF ELEC: 303 MOSM/KG (ref 275–295)
OSMOLALITY SERPL CALC.SUM OF ELEC: 304 MOSM/KG (ref 275–295)
OSMOLALITY SERPL CALC.SUM OF ELEC: 306 MOSM/KG (ref 275–295)
OSMOLALITY SERPL CALC.SUM OF ELEC: 307 MOSM/KG (ref 275–295)
OSMOLALITY SERPL CALC.SUM OF ELEC: 307 MOSM/KG (ref 275–295)
OSMOLALITY SERPL CALC.SUM OF ELEC: 311 MOSM/KG (ref 275–295)
OSMOLALITY SERPL CALC.SUM OF ELEC: 319 MOSM/KG (ref 275–295)
OSMOLALITY SERPL CALC.SUM OF ELEC: 319 MOSM/KG (ref 275–295)
OXYCODONE UR QL SCN: NEGATIVE
OXYGEN LITERS/MINUTE: 2 L/MIN
PCO2 BLDA: 38 MM HG (ref 35–45)
PCO2 BLDA: 41 MM HG (ref 35–45)
PCP UR QL SCN: NEGATIVE
PEEP SETTING VENT: 5 CM H2O
PH BLDA: 7.14 [PH] (ref 7.35–7.45)
PH BLDA: 7.21 [PH] (ref 7.35–7.45)
PH UR: 5 [PH] (ref 5–8)
PHOSPHATE SERPL-MCNC: 7.3 MG/DL (ref 2.5–4.9)
PHOSPHATE SERPL-MCNC: 8.2 MG/DL (ref 2.5–4.9)
PHOSPHATE SERPL-MCNC: 8.6 MG/DL (ref 2.5–4.9)
PHOSPHATE SERPL-MCNC: 9 MG/DL (ref 2.5–4.9)
PLATELET # BLD AUTO: 171 10(3)UL (ref 150–450)
PLATELET # BLD AUTO: 172 10(3)UL (ref 150–450)
PLATELET # BLD AUTO: 188 10(3)UL (ref 150–450)
PLATELET # BLD AUTO: 197 10(3)UL (ref 150–450)
PLATELET # BLD AUTO: 198 10(3)UL (ref 150–450)
PLATELET # BLD AUTO: 211 10(3)UL (ref 150–450)
PLATELET # BLD AUTO: 242 10(3)UL (ref 150–450)
PLATELET # BLD AUTO: 246 10(3)UL (ref 150–450)
PLATELET # BLD AUTO: 292 10(3)UL (ref 150–450)
PLATELET MORPHOLOGY: NORMAL
PLATELET MORPHOLOGY: NORMAL
PO2 BLDA: 148 MM HG (ref 80–100)
PO2 BLDA: 82 MM HG (ref 80–100)
POTASSIUM (BLOOD GAS): 6.6 MMOL/L (ref 3.3–5.1)
POTASSIUM SERPL-SCNC: 3.8 MMOL/L (ref 3.5–5.1)
POTASSIUM SERPL-SCNC: 3.9 MMOL/L (ref 3.5–5.1)
POTASSIUM SERPL-SCNC: 4 MMOL/L (ref 3.5–5.1)
POTASSIUM SERPL-SCNC: 4 MMOL/L (ref 3.5–5.1)
POTASSIUM SERPL-SCNC: 5.6 MMOL/L (ref 3.5–5.1)
POTASSIUM SERPL-SCNC: 5.8 MMOL/L (ref 3.5–5.1)
POTASSIUM SERPL-SCNC: 5.8 MMOL/L (ref 3.5–5.1)
POTASSIUM SERPL-SCNC: 6.2 MMOL/L (ref 3.5–5.1)
POTASSIUM SERPL-SCNC: 6.3 MMOL/L (ref 3.5–5.1)
POTASSIUM SERPL-SCNC: 6.4 MMOL/L (ref 3.5–5.1)
POTASSIUM SERPL-SCNC: 6.4 MMOL/L (ref 3.5–5.1)
PROCALCITONIN SERPL-MCNC: 0.68 NG/ML (ref ?–0.16)
PROT UR-MCNC: 100 MG/DL
PROT UR-MCNC: 100 MG/DL
PROT UR-MCNC: 30 MG/DL
PROTHROMBIN TIME: 17.8 SECONDS (ref 11.8–14.5)
PROTHROMBIN TIME: 19.4 SECONDS (ref 11.8–14.5)
PROTHROMBIN TIME: 38.4 SECONDS (ref 11.8–14.5)
PUNCTURE CHARGE: YES
PUNCTURE CHARGE: YES
RBC # BLD AUTO: 2.75 X10(6)UL (ref 3.8–5.8)
RBC # BLD AUTO: 3.05 X10(6)UL (ref 3.8–5.8)
RBC # BLD AUTO: 3.06 X10(6)UL (ref 3.8–5.8)
RBC # BLD AUTO: 3.07 X10(6)UL (ref 3.8–5.8)
RBC # BLD AUTO: 3.1 X10(6)UL (ref 3.8–5.8)
RBC # BLD AUTO: 3.32 X10(6)UL (ref 3.8–5.8)
RBC # BLD AUTO: 3.35 X10(6)UL (ref 3.8–5.8)
RBC # BLD AUTO: 4.06 X10(6)UL (ref 3.8–5.8)
RBC # BLD AUTO: 4.15 X10(6)UL (ref 3.8–5.8)
RBC #/AREA URNS AUTO: 3 /HPF
RBC #/AREA URNS AUTO: 80 /HPF
RBC #/AREA URNS AUTO: 9 /HPF
RESP RATE: 14 BPM
SAO2 % BLDA: 96.9 % (ref 94–100)
SAO2 % BLDA: >99 % (ref 94–100)
SARS-COV-2 RNA RESP QL NAA+PROBE: NOT DETECTED
SARS-COV-2 RNA RESP QL NAA+PROBE: NOT DETECTED
SODIUM (BLOOD GAS): 141 MMOL/L (ref 135–145)
SODIUM SERPL-SCNC: 140 MMOL/L (ref 136–145)
SODIUM SERPL-SCNC: 141 MMOL/L (ref 136–145)
SODIUM SERPL-SCNC: 142 MMOL/L (ref 136–145)
SODIUM SERPL-SCNC: 143 MMOL/L (ref 136–145)
SODIUM SERPL-SCNC: 143 MMOL/L (ref 136–145)
SODIUM SERPL-SCNC: 144 MMOL/L (ref 136–145)
SODIUM SERPL-SCNC: 146 MMOL/L (ref 136–145)
SODIUM SERPL-SCNC: 146 MMOL/L (ref 136–145)
SODIUM SERPL-SCNC: 24 MMOL/L
SP GR UR STRIP: 1.01 (ref 1–1.03)
SP GR UR STRIP: 1.02 (ref 1–1.03)
SP GR UR STRIP: 1.03 (ref 1–1.03)
SPECIMEN VOL 24H UR: 500 ML
TOTAL CELLS COUNTED: 100
TOTAL CELLS COUNTED: 100
TOTAL IRON BINDING CAPACITY: 301 UG/DL (ref 240–450)
TRANSFERRIN SERPL-MCNC: 202 MG/DL (ref 200–360)
TROPONIN I SERPL-MCNC: <0.045 NG/ML (ref ?–0.04)
UROBILINOGEN UR STRIP-ACNC: <2
WBC # BLD AUTO: 21.9 X10(3) UL (ref 4–11)
WBC # BLD AUTO: 24.8 X10(3) UL (ref 4–11)
WBC # BLD AUTO: 31.8 X10(3) UL (ref 4–11)
WBC # BLD AUTO: 6.7 X10(3) UL (ref 4–11)
WBC # BLD AUTO: 7 X10(3) UL (ref 4–11)
WBC # BLD AUTO: 8 X10(3) UL (ref 4–11)
WBC # BLD AUTO: 8.9 X10(3) UL (ref 4–11)
WBC # BLD AUTO: 9.3 X10(3) UL (ref 4–11)
WBC # BLD AUTO: 9.4 X10(3) UL (ref 4–11)
WBC #/AREA URNS AUTO: 18 /HPF
WBC #/AREA URNS AUTO: 180 /HPF
WBC #/AREA URNS AUTO: 31 /HPF

## 2020-01-01 PROCEDURE — 71045 X-RAY EXAM CHEST 1 VIEW: CPT | Performed by: INTERNAL MEDICINE

## 2020-01-01 PROCEDURE — 93306 TTE W/DOPPLER COMPLETE: CPT | Performed by: INTERNAL MEDICINE

## 2020-01-01 PROCEDURE — 99291 CRITICAL CARE FIRST HOUR: CPT | Performed by: INTERNAL MEDICINE

## 2020-01-01 PROCEDURE — 76705 ECHO EXAM OF ABDOMEN: CPT | Performed by: NURSE PRACTITIONER

## 2020-01-01 PROCEDURE — 99223 1ST HOSP IP/OBS HIGH 75: CPT | Performed by: INTERNAL MEDICINE

## 2020-01-01 PROCEDURE — 30903 CONTROL OF NOSEBLEED: CPT

## 2020-01-01 PROCEDURE — 99233 SBSQ HOSP IP/OBS HIGH 50: CPT | Performed by: INTERNAL MEDICINE

## 2020-01-01 PROCEDURE — 02HV33Z INSERTION OF INFUSION DEVICE INTO SUPERIOR VENA CAVA, PERCUTANEOUS APPROACH: ICD-10-PCS | Performed by: INTERNAL MEDICINE

## 2020-01-01 PROCEDURE — 5A1935Z RESPIRATORY VENTILATION, LESS THAN 24 CONSECUTIVE HOURS: ICD-10-PCS | Performed by: INTERNAL MEDICINE

## 2020-01-01 PROCEDURE — 36556 INSERT NON-TUNNEL CV CATH: CPT | Performed by: INTERNAL MEDICINE

## 2020-01-01 PROCEDURE — 99220 INITIAL OBSERVATION CARE,LEVL III: CPT | Performed by: HOSPITALIST

## 2020-01-01 PROCEDURE — 99226 SUBSEQUENT OBSERVATION CARE: CPT | Performed by: HOSPITALIST

## 2020-01-01 PROCEDURE — 093K7ZZ CONTROL BLEEDING IN NASAL MUCOSA AND SOFT TISSUE, VIA NATURAL OR ARTIFICIAL OPENING: ICD-10-PCS | Performed by: SPECIALIST

## 2020-01-01 PROCEDURE — 74021 RADEX ABDOMEN 3+ VIEWS: CPT | Performed by: HOSPITALIST

## 2020-01-01 PROCEDURE — 99291 CRITICAL CARE FIRST HOUR: CPT | Performed by: HOSPITALIST

## 2020-01-01 PROCEDURE — 31500 INSERT EMERGENCY AIRWAY: CPT | Performed by: INTERNAL MEDICINE

## 2020-01-01 PROCEDURE — 0BH17EZ INSERTION OF ENDOTRACHEAL AIRWAY INTO TRACHEA, VIA NATURAL OR ARTIFICIAL OPENING: ICD-10-PCS | Performed by: INTERNAL MEDICINE

## 2020-01-01 PROCEDURE — 99284 EMERGENCY DEPT VISIT MOD MDM: CPT

## 2020-01-01 PROCEDURE — 76770 US EXAM ABDO BACK WALL COMP: CPT | Performed by: EMERGENCY MEDICINE

## 2020-01-01 PROCEDURE — 5A12012 PERFORMANCE OF CARDIAC OUTPUT, SINGLE, MANUAL: ICD-10-PCS | Performed by: INTERNAL MEDICINE

## 2020-01-01 PROCEDURE — 71045 X-RAY EXAM CHEST 1 VIEW: CPT | Performed by: EMERGENCY MEDICINE

## 2020-01-01 PROCEDURE — 80048 BASIC METABOLIC PNL TOTAL CA: CPT | Performed by: EMERGENCY MEDICINE

## 2020-01-01 PROCEDURE — 36415 COLL VENOUS BLD VENIPUNCTURE: CPT

## 2020-01-01 PROCEDURE — 85025 COMPLETE CBC W/AUTO DIFF WBC: CPT | Performed by: EMERGENCY MEDICINE

## 2020-01-01 PROCEDURE — 99217 OBSERVATION CARE DISCHARGE: CPT | Performed by: INTERNAL MEDICINE

## 2020-01-01 PROCEDURE — 71045 X-RAY EXAM CHEST 1 VIEW: CPT | Performed by: HOSPITALIST

## 2020-01-01 RX ORDER — METOPROLOL TARTRATE 5 MG/5ML
5 INJECTION INTRAVENOUS
Status: DISCONTINUED | OUTPATIENT
Start: 2020-01-01 | End: 2020-01-01

## 2020-01-01 RX ORDER — METOPROLOL TARTRATE 5 MG/5ML
2.5 INJECTION INTRAVENOUS AS NEEDED
Status: DISCONTINUED | OUTPATIENT
Start: 2020-01-01 | End: 2020-01-01

## 2020-01-01 RX ORDER — AMIODARONE HYDROCHLORIDE 200 MG/1
200 TABLET ORAL ONCE
Status: COMPLETED | OUTPATIENT
Start: 2020-01-01 | End: 2020-01-01

## 2020-01-01 RX ORDER — METOPROLOL TARTRATE 100 MG/1
100 TABLET ORAL 2 TIMES DAILY
Status: DISCONTINUED | OUTPATIENT
Start: 2020-01-01 | End: 2020-01-01

## 2020-01-01 RX ORDER — SODIUM CHLORIDE 0.9 % (FLUSH) 0.9 %
3 SYRINGE (ML) INJECTION AS NEEDED
Status: DISCONTINUED | OUTPATIENT
Start: 2020-01-01 | End: 2020-01-01

## 2020-01-01 RX ORDER — FUROSEMIDE 20 MG/1
10 TABLET ORAL ONCE
Status: COMPLETED | OUTPATIENT
Start: 2020-01-01 | End: 2020-01-01

## 2020-01-01 RX ORDER — DEXTROSE MONOHYDRATE 25 G/50ML
50 INJECTION, SOLUTION INTRAVENOUS
Status: DISCONTINUED | OUTPATIENT
Start: 2020-01-01 | End: 2020-01-01 | Stop reason: HOSPADM

## 2020-01-01 RX ORDER — ALBUMIN, HUMAN INJ 5% 5 %
500 SOLUTION INTRAVENOUS ONCE
Status: COMPLETED | OUTPATIENT
Start: 2020-01-01 | End: 2020-01-01

## 2020-01-01 RX ORDER — SIMVASTATIN 10 MG
10 TABLET ORAL NIGHTLY
COMMUNITY

## 2020-01-01 RX ORDER — MORPHINE SULFATE 2 MG/ML
2 INJECTION, SOLUTION INTRAMUSCULAR; INTRAVENOUS ONCE
Status: DISCONTINUED | OUTPATIENT
Start: 2020-01-01 | End: 2020-08-19

## 2020-01-01 RX ORDER — DOPAMINE HYDROCHLORIDE 320 MG/100ML
INJECTION, SOLUTION INTRAVENOUS CONTINUOUS
Status: DISCONTINUED | OUTPATIENT
Start: 2020-01-01 | End: 2020-08-19

## 2020-01-01 RX ORDER — LIDOCAINE HYDROCHLORIDE 10 MG/ML
INJECTION, SOLUTION EPIDURAL; INFILTRATION; INTRACAUDAL; PERINEURAL AS NEEDED
Status: DISCONTINUED | OUTPATIENT
Start: 2020-01-01 | End: 2020-01-01 | Stop reason: SURG

## 2020-01-01 RX ORDER — POTASSIUM CITRATE 10 MEQ/1
10 TABLET, EXTENDED RELEASE ORAL DAILY
COMMUNITY

## 2020-01-01 RX ORDER — METOPROLOL TARTRATE 5 MG/5ML
2.5 INJECTION INTRAVENOUS
Status: DISCONTINUED | OUTPATIENT
Start: 2020-01-01 | End: 2020-01-01

## 2020-01-01 RX ORDER — HYDROMORPHONE HYDROCHLORIDE 1 MG/ML
0.2 INJECTION, SOLUTION INTRAMUSCULAR; INTRAVENOUS; SUBCUTANEOUS EVERY 5 MIN PRN
Status: DISCONTINUED | OUTPATIENT
Start: 2020-01-01 | End: 2020-01-01 | Stop reason: HOSPADM

## 2020-01-01 RX ORDER — SCOLOPAMINE TRANSDERMAL SYSTEM 1 MG/1
1 PATCH, EXTENDED RELEASE TRANSDERMAL
Status: DISCONTINUED | OUTPATIENT
Start: 2020-01-01 | End: 2020-08-19

## 2020-01-01 RX ORDER — SODIUM POLYSTYRENE SULFONATE 4.1 MEQ/G
30 POWDER, FOR SUSPENSION ORAL; RECTAL ONCE
Status: COMPLETED | OUTPATIENT
Start: 2020-01-01 | End: 2020-01-01

## 2020-01-01 RX ORDER — LABETALOL HYDROCHLORIDE 5 MG/ML
20 INJECTION, SOLUTION INTRAVENOUS ONCE
Status: DISCONTINUED | OUTPATIENT
Start: 2020-01-01 | End: 2020-01-01

## 2020-01-01 RX ORDER — AMIODARONE HYDROCHLORIDE 200 MG/1
200 TABLET ORAL DAILY
COMMUNITY

## 2020-01-01 RX ORDER — HYDROMORPHONE HYDROCHLORIDE 1 MG/ML
0.6 INJECTION, SOLUTION INTRAMUSCULAR; INTRAVENOUS; SUBCUTANEOUS EVERY 5 MIN PRN
Status: DISCONTINUED | OUTPATIENT
Start: 2020-01-01 | End: 2020-01-01 | Stop reason: HOSPADM

## 2020-01-01 RX ORDER — MORPHINE SULFATE IN 0.9 % NACL 1 MG/ML
1 PLASTIC BAG, INJECTION (ML) INTRAVENOUS CONTINUOUS
Status: DISCONTINUED | OUTPATIENT
Start: 2020-01-01 | End: 2020-08-19

## 2020-01-01 RX ORDER — OMEPRAZOLE 20 MG/1
20 CAPSULE, DELAYED RELEASE ORAL EVERY MORNING
COMMUNITY

## 2020-01-01 RX ORDER — ALBUMIN, HUMAN INJ 5% 5 %
SOLUTION INTRAVENOUS
Status: DISCONTINUED
Start: 2020-01-01 | End: 2020-08-19

## 2020-01-01 RX ORDER — LISINOPRIL 40 MG/1
40 TABLET ORAL DAILY
Status: DISCONTINUED | OUTPATIENT
Start: 2020-01-01 | End: 2020-01-01

## 2020-01-01 RX ORDER — SODIUM CHLORIDE 0.9 % (FLUSH) 0.9 %
3 SYRINGE (ML) INJECTION AS NEEDED
Status: DISCONTINUED | OUTPATIENT
Start: 2020-01-01 | End: 2020-08-19

## 2020-01-01 RX ORDER — ONDANSETRON 2 MG/ML
4 INJECTION INTRAMUSCULAR; INTRAVENOUS EVERY 6 HOURS PRN
Status: DISCONTINUED | OUTPATIENT
Start: 2020-01-01 | End: 2020-01-01

## 2020-01-01 RX ORDER — SODIUM POLYSTYRENE SULFONATE 4.1 MEQ/G
30 POWDER, FOR SUSPENSION ORAL; RECTAL ONCE
Status: DISCONTINUED | OUTPATIENT
Start: 2020-01-01 | End: 2020-01-01

## 2020-01-01 RX ORDER — HALOPERIDOL 5 MG/ML
0.25 INJECTION INTRAMUSCULAR ONCE AS NEEDED
Status: ACTIVE | OUTPATIENT
Start: 2020-01-01 | End: 2020-01-01

## 2020-01-01 RX ORDER — ONDANSETRON 2 MG/ML
4 INJECTION INTRAMUSCULAR; INTRAVENOUS EVERY 6 HOURS PRN
Status: DISCONTINUED | OUTPATIENT
Start: 2020-01-01 | End: 2020-08-19

## 2020-01-01 RX ORDER — VANCOMYCIN 2 GRAM/500 ML IN 0.9 % SODIUM CHLORIDE INTRAVENOUS
25 ONCE
Status: COMPLETED | OUTPATIENT
Start: 2020-01-01 | End: 2020-01-01

## 2020-01-01 RX ORDER — ALBUMIN, HUMAN INJ 5% 5 %
SOLUTION INTRAVENOUS
Status: COMPLETED
Start: 2020-01-01 | End: 2020-01-01

## 2020-01-01 RX ORDER — HYDRALAZINE HYDROCHLORIDE 25 MG/1
25 TABLET, FILM COATED ORAL EVERY 8 HOURS SCHEDULED
Status: DISCONTINUED | OUTPATIENT
Start: 2020-01-01 | End: 2020-01-01

## 2020-01-01 RX ORDER — HYDROCODONE BITARTRATE AND ACETAMINOPHEN 5; 325 MG/1; MG/1
1 TABLET ORAL AS NEEDED
Status: DISCONTINUED | OUTPATIENT
Start: 2020-01-01 | End: 2020-01-01 | Stop reason: HOSPADM

## 2020-01-01 RX ORDER — ONDANSETRON 2 MG/ML
4 INJECTION INTRAMUSCULAR; INTRAVENOUS ONCE AS NEEDED
Status: ACTIVE | OUTPATIENT
Start: 2020-01-01 | End: 2020-01-01

## 2020-01-01 RX ORDER — FUROSEMIDE 20 MG/1
20 TABLET ORAL DAILY
COMMUNITY

## 2020-01-01 RX ORDER — HYDROMORPHONE HYDROCHLORIDE 1 MG/ML
0.4 INJECTION, SOLUTION INTRAMUSCULAR; INTRAVENOUS; SUBCUTANEOUS EVERY 5 MIN PRN
Status: DISCONTINUED | OUTPATIENT
Start: 2020-01-01 | End: 2020-01-01 | Stop reason: HOSPADM

## 2020-01-01 RX ORDER — MORPHINE SULFATE 2 MG/ML
2 INJECTION, SOLUTION INTRAMUSCULAR; INTRAVENOUS EVERY 10 MIN PRN
Status: DISCONTINUED | OUTPATIENT
Start: 2020-01-01 | End: 2020-01-01 | Stop reason: HOSPADM

## 2020-01-01 RX ORDER — MUSCLE RUB CREAM 100; 150 MG/G; MG/G
CREAM TOPICAL 3 TIMES DAILY PRN
Status: DISCONTINUED | OUTPATIENT
Start: 2020-01-01 | End: 2020-01-01

## 2020-01-01 RX ORDER — ONDANSETRON 2 MG/ML
4 INJECTION INTRAMUSCULAR; INTRAVENOUS ONCE
Status: COMPLETED | OUTPATIENT
Start: 2020-01-01 | End: 2020-01-01

## 2020-01-01 RX ORDER — METOPROLOL TARTRATE 5 MG/5ML
5 INJECTION INTRAVENOUS AS NEEDED
Status: DISCONTINUED | OUTPATIENT
Start: 2020-01-01 | End: 2020-01-01

## 2020-01-01 RX ORDER — MORPHINE SULFATE 4 MG/ML
4 INJECTION, SOLUTION INTRAMUSCULAR; INTRAVENOUS EVERY 10 MIN PRN
Status: DISCONTINUED | OUTPATIENT
Start: 2020-01-01 | End: 2020-01-01 | Stop reason: HOSPADM

## 2020-01-01 RX ORDER — PRAVASTATIN SODIUM 20 MG
20 TABLET ORAL NIGHTLY
Status: DISCONTINUED | OUTPATIENT
Start: 2020-01-01 | End: 2020-01-01

## 2020-01-01 RX ORDER — DEXTROSE MONOHYDRATE 25 G/50ML
50 INJECTION, SOLUTION INTRAVENOUS AS NEEDED
Status: DISCONTINUED | OUTPATIENT
Start: 2020-01-01 | End: 2020-08-19

## 2020-01-01 RX ORDER — MORPHINE SULFATE 4 MG/ML
1 INJECTION, SOLUTION INTRAMUSCULAR; INTRAVENOUS EVERY 4 HOURS PRN
Status: DISCONTINUED | OUTPATIENT
Start: 2020-01-01 | End: 2020-01-01

## 2020-01-01 RX ORDER — ENALAPRILAT 2.5 MG/2ML
1.25 INJECTION INTRAVENOUS EVERY 6 HOURS PRN
Status: DISCONTINUED | OUTPATIENT
Start: 2020-01-01 | End: 2020-01-01

## 2020-01-01 RX ORDER — DEXTROSE MONOHYDRATE 25 G/50ML
50 INJECTION, SOLUTION INTRAVENOUS
Status: DISCONTINUED | OUTPATIENT
Start: 2020-01-01 | End: 2020-08-19

## 2020-01-01 RX ORDER — LORAZEPAM 2 MG/ML
1 INJECTION INTRAMUSCULAR ONCE
Status: DISCONTINUED | OUTPATIENT
Start: 2020-01-01 | End: 2020-08-19

## 2020-01-01 RX ORDER — HYDRALAZINE HYDROCHLORIDE 20 MG/ML
10 INJECTION INTRAMUSCULAR; INTRAVENOUS EVERY 4 HOURS PRN
Status: DISCONTINUED | OUTPATIENT
Start: 2020-01-01 | End: 2020-01-01

## 2020-01-01 RX ORDER — PREGABALIN 150 MG/1
150 CAPSULE ORAL 2 TIMES DAILY
COMMUNITY

## 2020-01-01 RX ORDER — PHENYLEPHRINE HCL 10 MG/ML
VIAL (ML) INJECTION AS NEEDED
Status: DISCONTINUED | OUTPATIENT
Start: 2020-01-01 | End: 2020-01-01 | Stop reason: SURG

## 2020-01-01 RX ORDER — SODIUM CHLORIDE, SODIUM LACTATE, POTASSIUM CHLORIDE, CALCIUM CHLORIDE 600; 310; 30; 20 MG/100ML; MG/100ML; MG/100ML; MG/100ML
INJECTION, SOLUTION INTRAVENOUS CONTINUOUS
Status: DISCONTINUED | OUTPATIENT
Start: 2020-01-01 | End: 2020-01-01 | Stop reason: HOSPADM

## 2020-01-01 RX ORDER — SODIUM POLYSTYRENE SULFONATE 4.1 MEQ/G
15 POWDER, FOR SUSPENSION ORAL; RECTAL ONCE
Status: COMPLETED | OUTPATIENT
Start: 2020-01-01 | End: 2020-01-01

## 2020-01-01 RX ORDER — METOPROLOL TARTRATE 50 MG/1
50 TABLET, FILM COATED ORAL
Status: DISCONTINUED | OUTPATIENT
Start: 2020-01-01 | End: 2020-01-01

## 2020-01-01 RX ORDER — FINASTERIDE 5 MG/1
5 TABLET, FILM COATED ORAL DAILY
COMMUNITY

## 2020-01-01 RX ORDER — HYDRALAZINE HYDROCHLORIDE 25 MG/1
25 TABLET, FILM COATED ORAL EVERY 8 HOURS SCHEDULED
Qty: 90 TABLET | Refills: 0 | Status: SHIPPED | OUTPATIENT
Start: 2020-01-01 | End: 2020-01-01

## 2020-01-01 RX ORDER — DEXTROSE MONOHYDRATE 25 G/50ML
50 INJECTION, SOLUTION INTRAVENOUS ONCE
Status: COMPLETED | OUTPATIENT
Start: 2020-01-01 | End: 2020-01-01

## 2020-01-01 RX ORDER — MORPHINE SULFATE 10 MG/ML
6 INJECTION, SOLUTION INTRAMUSCULAR; INTRAVENOUS EVERY 10 MIN PRN
Status: DISCONTINUED | OUTPATIENT
Start: 2020-01-01 | End: 2020-01-01 | Stop reason: HOSPADM

## 2020-01-01 RX ORDER — ONDANSETRON 2 MG/ML
INJECTION INTRAMUSCULAR; INTRAVENOUS AS NEEDED
Status: DISCONTINUED | OUTPATIENT
Start: 2020-01-01 | End: 2020-01-01 | Stop reason: SURG

## 2020-01-01 RX ORDER — ALBUMIN, HUMAN INJ 5% 5 %
250 SOLUTION INTRAVENOUS ONCE
Status: COMPLETED | OUTPATIENT
Start: 2020-01-01 | End: 2020-01-01

## 2020-01-01 RX ORDER — NALOXONE HYDROCHLORIDE 0.4 MG/ML
80 INJECTION, SOLUTION INTRAMUSCULAR; INTRAVENOUS; SUBCUTANEOUS AS NEEDED
Status: DISCONTINUED | OUTPATIENT
Start: 2020-01-01 | End: 2020-01-01 | Stop reason: HOSPADM

## 2020-01-01 RX ORDER — SODIUM POLYSTYRENE SULFONATE 4.1 MEQ/G
45 POWDER, FOR SUSPENSION ORAL; RECTAL ONCE
Status: COMPLETED | OUTPATIENT
Start: 2020-01-01 | End: 2020-01-01

## 2020-01-01 RX ORDER — TOPIRAMATE 25 MG/1
25 TABLET ORAL 2 TIMES DAILY
COMMUNITY

## 2020-01-01 RX ORDER — LIDOCAINE HYDROCHLORIDE 20 MG/ML
10 SOLUTION OROPHARYNGEAL ONCE
Status: COMPLETED | OUTPATIENT
Start: 2020-01-01 | End: 2020-01-01

## 2020-01-01 RX ORDER — METOPROLOL TARTRATE 100 MG/1
100 TABLET ORAL 2 TIMES DAILY
COMMUNITY

## 2020-01-01 RX ORDER — HYDROCODONE BITARTRATE AND ACETAMINOPHEN 5; 325 MG/1; MG/1
2 TABLET ORAL AS NEEDED
Status: DISCONTINUED | OUTPATIENT
Start: 2020-01-01 | End: 2020-01-01 | Stop reason: HOSPADM

## 2020-01-01 RX ORDER — LISINOPRIL 40 MG/1
40 TABLET ORAL DAILY
COMMUNITY

## 2020-01-01 RX ORDER — SODIUM CHLORIDE 9 MG/ML
INJECTION, SOLUTION INTRAVENOUS CONTINUOUS
Status: DISCONTINUED | OUTPATIENT
Start: 2020-01-01 | End: 2020-01-01

## 2020-01-01 RX ORDER — LORAZEPAM 2 MG/ML
1 INJECTION INTRAMUSCULAR EVERY 4 HOURS PRN
Status: DISCONTINUED | OUTPATIENT
Start: 2020-01-01 | End: 2020-08-19

## 2020-01-01 RX ORDER — AMIODARONE HYDROCHLORIDE 200 MG/1
200 TABLET ORAL DAILY
Status: DISCONTINUED | OUTPATIENT
Start: 2020-01-01 | End: 2020-01-01

## 2020-01-01 RX ORDER — PANTOPRAZOLE SODIUM 20 MG/1
20 TABLET, DELAYED RELEASE ORAL
Status: DISCONTINUED | OUTPATIENT
Start: 2020-01-01 | End: 2020-01-01

## 2020-01-01 RX ORDER — CALCIUM ACETATE 667 MG/1
1 CAPSULE ORAL
Status: DISCONTINUED | OUTPATIENT
Start: 2020-01-01 | End: 2020-08-19

## 2020-01-01 RX ORDER — PREGABALIN 75 MG/1
150 CAPSULE ORAL 2 TIMES DAILY
Status: DISCONTINUED | OUTPATIENT
Start: 2020-01-01 | End: 2020-01-01

## 2020-01-01 RX ORDER — LORAZEPAM 2 MG/ML
0.5 INJECTION INTRAMUSCULAR ONCE
Status: COMPLETED | OUTPATIENT
Start: 2020-01-01 | End: 2020-01-01

## 2020-01-01 RX ORDER — TOPIRAMATE 25 MG/1
25 TABLET ORAL 2 TIMES DAILY
Status: DISCONTINUED | OUTPATIENT
Start: 2020-01-01 | End: 2020-01-01

## 2020-01-01 RX ORDER — FINASTERIDE 5 MG/1
5 TABLET, FILM COATED ORAL DAILY
Status: DISCONTINUED | OUTPATIENT
Start: 2020-01-01 | End: 2020-01-01

## 2020-01-01 RX ORDER — PROCHLORPERAZINE EDISYLATE 5 MG/ML
5 INJECTION INTRAMUSCULAR; INTRAVENOUS ONCE AS NEEDED
Status: ACTIVE | OUTPATIENT
Start: 2020-01-01 | End: 2020-01-01

## 2020-01-01 RX ADMIN — PHENYLEPHRINE HCL 100 MCG: 10 MG/ML VIAL (ML) INJECTION at 22:33:00

## 2020-01-01 RX ADMIN — ONDANSETRON 4 MG: 2 INJECTION INTRAMUSCULAR; INTRAVENOUS at 22:33:00

## 2020-01-01 RX ADMIN — LIDOCAINE HYDROCHLORIDE 50 MG: 10 INJECTION, SOLUTION EPIDURAL; INFILTRATION; INTRACAUDAL; PERINEURAL at 22:21:00

## 2020-07-30 PROBLEM — R73.9 HYPERGLYCEMIA: Status: ACTIVE | Noted: 2020-01-01

## 2020-07-30 PROBLEM — R04.0 ACUTE POSTERIOR EPISTAXIS: Status: ACTIVE | Noted: 2020-01-01

## 2020-07-30 PROBLEM — D64.9 ANEMIA: Status: ACTIVE | Noted: 2020-01-01

## 2020-07-30 NOTE — ED PROVIDER NOTES
Patient Seen in: [facility] AND [facility] Emergency Department      History   Patient presents with:  Nose Bleed    Stated Complaint: nose bleed - seen here this AM for same thing    HPI    Patient presents the emergency department with a nosebleed.   He was sta Conjunctiva/sclera: Conjunctivae normal.      Pupils: Pupils are equal, round, and reactive to light. Neck:      Musculoskeletal: Normal range of motion and neck supple. Cardiovascular:      Rate and Rhythm: Normal rate and regular rhythm.       Heart s Final result                 Please view results for these tests on the individual orders.    URINALYSIS WITH CULTURE REFLEX                  MDM     The Rhino Rocket was removed and a new one was replaced with adequate hemostasis once air was instil

## 2020-07-30 NOTE — ED INITIAL ASSESSMENT (HPI)
Brought by [facility] from home for epistaxis since 0800. On Eliquis. Per EMS pt had a nosebleed a few moths ago, taken to [facility], bleed stopped with cautery. Nose clamp applied by EMS, bleeding controlled.

## 2020-07-30 NOTE — ED PROVIDER NOTES
Patient Seen in: [facility] AND [facility] Emergency Department      History   Patient presents with:  Nose Bleed    Stated Complaint: epistaxis    HPI    67-year-old male with history of hypertension, diabetes, hyperlipidemia, status post pacemaker placement pr motion and are non tender  Skin: no rashes or lesions    ED Course     Labs Reviewed   BASIC METABOLIC PANEL (8) - Abnormal; Notable for the following components:       Result Value    Glucose 110 (*)     BUN 19 (*)     Calculated Osmolality 297 (*)     GF Charito Hubbards [id number]  [phone]          We recommend that you schedule follow up care with a primary care provider within the next three months to obtain basic health screening including reassessment of your blood pressure.       Medications Prescribed:  Cur

## 2020-07-31 NOTE — BRIEF OP NOTE
Pre-Operative Diagnosis: Nasal hemorrhage [R04.0]     Post-Operative Diagnosis: Nasal hemorrhage [R04.0]      Procedure Performed:   Procedure(s):  ENDOSCOPIC CAUTERIZATION OF RIGHT NASAL HEMORRHAGE    Surgeon(s) and Role:     * Yamilex Matute MD - [facility]

## 2020-07-31 NOTE — ED NOTES
Dr. Angela Quesada notified about patient eating a sandwich during ER stay. Patient did throw up sandwich which Dr. Angela Quesada was notified of.

## 2020-07-31 NOTE — PLAN OF CARE
Problem: Diabetes/Glucose Control  Goal: Glucose maintained within prescribed range  Description  INTERVENTIONS:  - Monitor Blood Glucose as ordered  - Assess for signs and symptoms of hyperglycemia and hypoglycemia  - Administer ordered medications to m life threatening arrhythmias  - Monitor electrolytes and administer replacement therapy as ordered  Outcome: Progressing       Problem: RESPIRATORY - ADULT  Goal: Achieves optimal ventilation and oxygenation  Description  INTERVENTIONS:  - Assess for quiroz sites to achieve adequate hemostasis  - Assess for signs and symptoms of internal bleeding  - Monitor lab trends  - Patient is to report abnormal signs of bleeding to staff  - Avoid use of toothpicks and dental floss  - Use electric shaver for shaving  - U

## 2020-07-31 NOTE — PROGRESS NOTES
ENT  Right nasal packs in place and dry  One time bloody sputum  Oropharynx without blood  Hemoglobin = 9.7  Imp: resolved severe posterior epistaxis  Plan: will allow transfer to telemetry           No eliquis           Advance diet           I will plan

## 2020-07-31 NOTE — PROGRESS NOTES
Double RN skin check done prior to transfer off Unit. Skin check performed by this RN and Kristina Ramon [facility]. Wounds are as follows: as charted in Epic    Will remain available for any further questions or concerns.

## 2020-07-31 NOTE — OPERATIVE REPORT
[facility]    PATIENT'S NAME: Gloria WINSLOW   ATTENDING PHYSICIAN: Epi Guevara MD   OPERATING PHYSICIAN: Kiesha Carreon MD   PATIENT ACCOUNT#:   [de-identified]    LOCATION:  [address] [place] [address] RECORD #:   [id number]       DATE OF BIRTH:  [birth year] alongside of this. After this was done, there was no further bleeding in the nose or the mouth. The stomach was fully suctioned. The patient was extubated in the operating suite and taken to the recovery room in good condition. Dictated By Zoe Munroe.  BRITNI

## 2020-07-31 NOTE — ED NOTES
Consent for endoscopic cauterization of right nasal hemorrhage obtained from patient's wife, two nurse verification obtained for telephone consent.

## 2020-07-31 NOTE — H&P
[facility]    PATIENT'S NAME: Neil WINSLOW   ATTENDING PHYSICIAN: Epi Leger MD   PATIENT ACCOUNT#:   [id number]    LOCATION:  [address] [place] [address] #:   [id number]       YOB: 1935  ADMISSION DATE:       07/30/2020 and his blood pressure did come under better control. Serial hemoglobins were ordered and he was admitted to the PCU. PAST MEDICAL HISTORY:  Significant for hypertension, diabetes, hyperlipidemia, neuropathy, atrial flutter on Xarelto.     PAST SURGICAL clear without wheezes or rhonchi. HEART:  Regular rate and rhythm. Normal S1, S2.  ABDOMEN:  Soft and nontender with normoactive bowel sounds. No guarding. No rebound. EXTREMITIES:  No clubbing, cyanosis, calf tenderness, or palpable cords.   SKIN:  Wa

## 2020-07-31 NOTE — PLAN OF CARE
Problem: Diabetes/Glucose Control  Goal: Glucose maintained within prescribed range  Description  INTERVENTIONS:  - Monitor Blood Glucose as ordered  - Assess for signs and symptoms of hyperglycemia and hypoglycemia  - Administer ordered medications to m Progressing  Goal: Absence of cardiac arrhythmias or at baseline  Description  INTERVENTIONS:  - Continuous cardiac monitoring, monitor vital signs, obtain 12 lead EKG if indicated  - Evaluate effectiveness of antiarrhythmic and heart rate control medicati symptoms of bleeding or hemorrhage  - Monitor labs and vital signs for trends  - Administer supportive blood products/factors, fluids and medications as ordered and appropriate  - Administer supportive blood products/factors as ordered and appropriate  7/3

## 2020-07-31 NOTE — ANESTHESIA PREPROCEDURE EVALUATION
Anesthesia PreOp Note    HPI:     Ashanti Cochran is a 81year old male who presents for preoperative consultation requested by: Anastacia Mcallister MD    Date of Surgery: 7/30/2020    Procedure(s):  NASAL EPISTAXIS  Indication: Nasal hemorrhage [R04.0]    Relev Transportation needs:        Medical: Not on file        Non-medical: Not on file    Tobacco Use      Smoking status: Never Smoker      Smokeless tobacco: Never Used    Substance and Sexual Activity      Alcohol use: Not on file      Drug use: Not on file 07/30/20 2045 07/30/20 2141   BP: (!) 140/94 152/89 (!) 186/119 (!) 161/89   Pulse: 95 103 (!) 133 93   Resp: 20 20 20   Temp:       TempSrc:       SpO2: 95% (!) 20%     Weight:       Height:            Anesthesia Evaluation     Patient summary reviewed

## 2020-07-31 NOTE — ED NOTES
.Orders for admission, pt is aware of plan, ready to go upstairs, any questions, please call ed rn Alida Tavera @ ext [phone]. Pt initially hypertensive, pt was having multiple coughing fits but now has resolved.

## 2020-07-31 NOTE — CONSULTS
[facility]    PATIENT'S NAME: Juan Sparks   ATTENDING PHYSICIAN: Kat Jacobsen MD   CONSULTING PHYSICIAN: Kat Jacobsen MD   PATIENT ACCOUNT#:   [de-identified]    LOCATION:  [facility] PACU [address] [place] [address]  MEDICAL RECORD #:   [id number]       DATE OF 23:15:02  [facility] [id number]  [facility]/

## 2020-07-31 NOTE — PROGRESS NOTES
Post midnight follow up note. Patient was seen and examined. Laying in bed. No complaints at present.    S/p Endoscopic cauterization of right nasal hemorrhage last night   ENT following   Plan for removal of packs on Sunday if bleeding remains controlled

## 2020-07-31 NOTE — ED NOTES
Dr. Marlen Fuentes attempted to stop nose bleed with multiple bedside procedures. Patient still bleeding from nose. Dr. Marlen Fuentes to take patient to operating room. Will contact wife for consent due to patient's mental status. Consent given to Dr. Marlen Fuentes.

## 2020-07-31 NOTE — ANESTHESIA PROCEDURE NOTES
Airway  Date/Time: 7/30/2020 10:29 PM  Urgency: Elective    Airway not difficult    General Information and Staff    Patient location during procedure: OR  Anesthesiologist: Jaz Recinos MD  Performed: anesthesiologist     Indications and Patient Minor Ho

## 2020-08-01 NOTE — PLAN OF CARE
Problem: Diabetes/Glucose Control  Goal: Glucose maintained within prescribed range  Description  INTERVENTIONS:  - Monitor Blood Glucose as ordered  - Assess for signs and symptoms of hyperglycemia and hypoglycemia  - Administer ordered medications to m replacement therapy as ordered  Outcome: Progressing     Problem: RESPIRATORY - ADULT  Goal: Achieves optimal ventilation and oxygenation  Description  INTERVENTIONS:  - Assess for changes in respiratory status  - Assess for changes in mentation and behavi report abnormal signs of bleeding to staff  - Avoid use of toothpicks and dental floss  - Use electric shaver for shaving  - Use soft bristle tooth brush  - Limit straining and forceful nose blowing  Outcome: Progressing     Problem: Patient Centered Care

## 2020-08-01 NOTE — ANESTHESIA POSTPROCEDURE EVALUATION
Patient: Osbaldo Duty    Procedure Summary     Date:  07/30/20 Room / Location:  [address] [place] [address] MAIN OR 03 / [address] [facility] MAIN OR    Anesthesia Start:  0685 Anesthesia Stop:  2314    Procedure:  NASAL EPISTAXIS (Right Nose) Diagnosis:       Nasal hemorrhage      (Nasal hemor

## 2020-08-01 NOTE — PROGRESS NOTES
[facility]  Hospitalist Progress Note     Cynthia  Patient Status:  Observation    1935  81year old CSN [id number]   Location 336/336-A Attending Sesar Patel MD   Hosp Day # 0 PCP Nereida Torres     ASSESSMENT/PLAN    Poste Skin is warm and dry. No rashes, erythema, diaphoresis. Psych: Normal mood and affect.  Calm, cooperative    Labs:  Recent Labs   Lab 07/31/20  0357 07/31/20  1253 08/01/20  0530   RBC 3.35* 3.05* 3.07*   HGB 9.7* 8.9* 9.0*   HCT 31.5* 28.6* 28.9*   MCV 9

## 2020-08-01 NOTE — PLAN OF CARE
Pt is full code per wife. No c/o pain. Pt had cauterization of Right nostril due to hemorrhage. Elequis on hold. Ancef for prophylaxis. Packing to be removed on Sunday if no bleeding occurs - see MD note. Accu check achs. FALL RISK. [place].  Abrasion to R knee baseline  Description  INTERVENTIONS:  - Continuous cardiac monitoring, monitor vital signs, obtain 12 lead EKG if indicated  - Evaluate effectiveness of antiarrhythmic and heart rate control medications as ordered  - Initiate emergency measures for life t with low platelets)  INTERVENTIONS:  - Avoid intramuscular injections, enemas and rectal medication administration  - Ensure safe mobilization of patient  - Hold pressure on venipuncture sites to achieve adequate hemostasis  - Assess for signs and symptoms

## 2020-08-01 NOTE — PROGRESS NOTES
ENT POD #2  Complaints of sore throat  PE: nose = merocel packing completely without blood and no blood in the nasopharynx  Hb= 9.0  Imp: posterior epistaxis - controlled  Plan: will plan to unpack tomorrow.   Can discharge if no further bleeding and if pre

## 2020-08-02 NOTE — PLAN OF CARE
Problem: Patient Centered Care  Goal: Patient preferences are identified and integrated in the patient's plan of care  Description  Interventions:  - What would you like us to know as we care for you?  Update patient with plan of care   - Provide timely, co antiarrhythmic and heart rate control medications as ordered  - Initiate emergency measures for life threatening arrhythmias  - Monitor electrolytes and administer replacement therapy as ordered  Outcome: Progressing     Problem: SKIN/TISSUE INTEGRITY - AD Cessation handout, if applicable  - Encourage broncho-pulmonary hygiene including cough, deep breathe, Incentive Spirometry  - Assess the need for suctioning and perform as needed  - Assess and instruct to report SOB or any respiratory difficulty  - Respir

## 2020-08-02 NOTE — PROGRESS NOTES
[facility]  Hospitalist Progress Note     David Malick Patient Status:  Observation    1935  81year old CSN [id number]   Location 336/336-A Attending Betazida Baca MD   Hosp Day # 0 PCP Minor Proctor     ASSESSMENT/PLAN    Poste effusions. No peripheral edema. Skin: Skin is warm and dry. No rashes, erythema, diaphoresis. Psych: Normal mood and affect.  Calm, cooperative    Labs:  Recent Labs   Lab 07/31/20  0357 07/31/20  1253 08/01/20  0530 08/02/20  0441   RBC 3.35* 3.05* 3.0

## 2020-08-02 NOTE — PLAN OF CARE
NASAL PACKING WAS NOT REMOVED TODAY D/T BLOOD IN THE BACK OF THE THROAT, PLANS TO REMOVED IT LATE TOMORROW AFTERNOON AND D/C HOME Tuesday MORNING,     Problem: Diabetes/Glucose Control  Goal: Glucose maintained within prescribed range  Description  INTERVE rate control medications as ordered  - Initiate emergency measures for life threatening arrhythmias  - Monitor electrolytes and administer replacement therapy as ordered  Outcome: Progressing     Problem: RESPIRATORY - ADULT  Goal: Achieves optimal ventila venipuncture sites to achieve adequate hemostasis  - Assess for signs and symptoms of internal bleeding  - Monitor lab trends  - Patient is to report abnormal signs of bleeding to staff  - Avoid use of toothpicks and dental floss  - Use electric shaver for

## 2020-08-02 NOTE — PROGRESS NOTES
ENT  Reports some post nasal drainage. Not spitting up any blood  PE: small clot in the right oropharynx. Nasal packs completely white  Hemoglobin = 8.6 was 9 yesterday  Imp: some blood in the oropharynx.   Overall the pack is holding, however I am

## 2020-08-03 NOTE — PROGRESS NOTES
ENT  Blood pressure still slightly hypertensive  Packing without blood and oropharynx without blood  Packing = removed without incident. Very minimal blood tinged nasal secretions  Imp: epistaxis with packing removed from the right nares.   Plan: afrin x 2

## 2020-08-03 NOTE — PLAN OF CARE
Pt ambulated in halls prior to bedtime. Muscle rub administered for bilateral knee and foot pain. Con't IV ancef. + post nasal drip. R nasal packing in place. Plan to remove nasal packing on Monday. Anticipating discharge on Tuesday.      Problem: Diabetes/ obtain 12 lead EKG if indicated  - Evaluate effectiveness of antiarrhythmic and heart rate control medications as ordered  - Initiate emergency measures for life threatening arrhythmias  - Monitor electrolytes and administer replacement therapy as ordered medication administration  - Ensure safe mobilization of patient  - Hold pressure on venipuncture sites to achieve adequate hemostasis  - Assess for signs and symptoms of internal bleeding  - Monitor lab trends  - Patient is to report abnormal signs of ble

## 2020-08-03 NOTE — PLAN OF CARE
NASAL PACKING REMOVED TODAY, PLAN TO D/C HOME TOMORROW    Problem: Diabetes/Glucose Control  Goal: Glucose maintained within prescribed range  Description  INTERVENTIONS:  - Monitor Blood Glucose as ordered  - Assess for signs and symptoms of hyperglycemia for changes in respiratory status  - Assess for changes in mentation and behavior  - Position to facilitate oxygenation and minimize respiratory effort  - Oxygen supplementation based on oxygen saturation or ABGs  - Provide Smoking Cessation handout, if ap forceful nose blowing  Outcome: Progressing     Problem: Patient Centered Care  Goal: Patient preferences are identified and integrated in the patient's plan of care  Description  Interventions:  - What would you like us to know as we care for you?  My wife

## 2020-08-03 NOTE — PROGRESS NOTES
[facility]    Progress Note    Sanam Verdugo Galvan Patient Status:  Observation    1935 MRN [id number]   Location [facility] 3W/SW Attending Hakeem Nash MD   Hosp Day # 0 PCP Mary Ching           Attending Addendum signed likely hold Eliquis upon discharge with short interval cardiology reevaluation  -Patient may be a better candidate for aspirin as opposed to anticoagulation     Hypertensive emergency. Blood pressures initially over 200 with significant bleeding.   This is

## 2020-08-04 NOTE — PROGRESS NOTES
ENT POD #5  Unpacked yesterday and no further bleeding  Blood pressure still intermittently elevated  Imp: stable s/p unpacking. Still intermittently hypertensive. Plan: I will sign off. Call me if further issues.

## 2020-08-04 NOTE — PROGRESS NOTES
Received call from  that friends Kiki Monroe and Maria M Lui are here to visit patient. Explained to patient that we are only allowing 2 care partners to visit during a hospital stay and he already has his wife, Carmina Stevens as a care partner.   Pa

## 2020-08-04 NOTE — PLAN OF CARE
Problem: Diabetes/Glucose Control  Goal: Glucose maintained within prescribed range  Description  INTERVENTIONS:  - Monitor Blood Glucose as ordered  - Assess for signs and symptoms of hyperglycemia and hypoglycemia  - Administer ordered medications to m pressure on venipuncture sites to achieve adequate hemostasis  - Assess for signs and symptoms of internal bleeding  - Monitor lab trends  - Patient is to report abnormal signs of bleeding to staff  - Avoid use of toothpicks and dental floss  - Use electri when cleared medically, all pt needs met.

## 2020-08-04 NOTE — CM/SW NOTE
Received MDO for d/c planning. SW met w/ pt in his room. Pt verified his address and confirmed living w/ his wife. They have a home w/ 2 levels. There are 6 steps down and 7 steps up. Pt has no steps to enter the home.     Pt reports using a cane and/or

## 2020-08-04 NOTE — PROGRESS NOTES
[facility]    Progress Note    Shelley Marti Galvan Patient Status:  Observation    1935 MRN [id number]   Location [facility] 3W/SW Attending Jian Bower MD   Hosp Day # 0 PCP Julee Pepe           Attending Addendum signed follow-up  -We will likely hold Eliquis upon discharge with short interval cardiology reevaluation  -Patient may be a better candidate for aspirin as opposed to anticoagulation     Hypertensive emergency.   Blood pressures initially over 200 with significan

## 2020-08-05 NOTE — PLAN OF CARE
Latrice Franca is feeling well today, states, \"I need to go to rehab. Last time they sent me home with home PT, I ended up back here. They need to make me stronger. \" Awaiting PT consult for eval and determination of post hospital dispo.   Problem: Diabetes/Glucose C lead EKG if indicated  - Evaluate effectiveness of antiarrhythmic and heart rate control medications as ordered  - Initiate emergency measures for life threatening arrhythmias  - Monitor electrolytes and administer replacement therapy as ordered  Outcome: administration  - Ensure safe mobilization of patient  - Hold pressure on venipuncture sites to achieve adequate hemostasis  - Assess for signs and symptoms of internal bleeding  - Monitor lab trends  - Patient is to report abnormal signs of bleeding to st

## 2020-08-05 NOTE — DISCHARGE SUMMARY
[facility]    Discharge Summary    Ruth Irene Galvan Patient Status:  Observation    1935 MRN [id number]   Location [facility] 3W/SW Attending Krista Phillips MD   Hosp Day # 0 PCP MARCO AGRAWAL     Date of Admission: / However, prior to his transfer to a medical floor, he developed rebleeding and was taken to the operating room urgently. At that time, he underwent endoscopic cauterization of right nasal hemorrhage with packing.   Because of the recurrence of bleeding and problems  BPH  Hypertension  Diabetes  Dyslipidemia  Neuropathy    Vitals: Blood pressure 128/66, pulse 60, temperature 97.9 °F (36.6 °C), temperature source Oral, resp. rate 20, height 5' 7\" (1.702 m), weight 185 lb (83.9 kg), SpO2 97 %.     Follow up: sulfADIAZINE 1 % Crea  Commonly known as:  SILVADENE      AAA BID   Quantity:  50 g  Refills:  1     simvastatin 10 MG Tabs  Commonly known as:  ZOCOR      Take 10 mg by mouth nightly. Refills:  0     topiramate 25 MG Tabs  Commonly known as:   Topamax

## 2020-08-05 NOTE — PLAN OF CARE
Patient alert and oriented x 3-4, can be forgetful. Room air. Very hard of hearing. Scheduled hydralazine given for elevated bp. Awaiting PT to see patient for recommendation of placement. Will continue to monitor.     Problem: Diabetes/Glucose Control  [place] if indicated  - Evaluate effectiveness of antiarrhythmic and heart rate control medications as ordered  - Initiate emergency measures for life threatening arrhythmias  - Monitor electrolytes and administer replacement therapy as ordered  Outcome: Progressi administration  - Ensure safe mobilization of patient  - Hold pressure on venipuncture sites to achieve adequate hemostasis  - Assess for signs and symptoms of internal bleeding  - Monitor lab trends  - Patient is to report abnormal signs of bleeding to st

## 2020-08-05 NOTE — CM/SW NOTE
10: Chantell  Per RN rounds, pt is now requesting SNF at d/c again. BABS received call from LAKSHMI Calero that pt is medically cleared for d/c today.     BABS met w/ pt in his room and discussed SNF ref process and Medicare waiver (pt is covered pending ski

## 2020-08-05 NOTE — PLAN OF CARE
Margo Pickens is being discharged today with plan for home health care for PT.   Problem: Diabetes/Glucose Control  Goal: Glucose maintained within prescribed range  Description  INTERVENTIONS:  - Monitor Blood Glucose as ordered  - Assess for signs and symptoms of h trend weights  8/5/2020 1233 by Joshua Lundy, RN  Outcome: Adequate for Discharge  8/5/2020 1027 by Joshua Lundy, RN  Outcome: Progressing  Goal: Absence of cardiac arrhythmias or at baseline  Description  INTERVENTIONS:  - Continuous cardiac mon Discharge  8/5/2020 1027 by Germania Keane, RN  Outcome: Progressing     Problem: HEMATOLOGIC - ADULT  Goal: Maintains hematologic stability  Description  INTERVENTIONS  - Assess for signs and symptoms of bleeding or hemorrhage  - Monitor labs and vital Discharge  8/5/2020 1027 by Bita Marinelli RN  Outcome: Progressing     Problem: PAIN - ADULT  Goal: Verbalizes/displays adequate comfort level or patient's stated pain goal  Description  INTERVENTIONS:  - Encourage pt to monitor pain and request charbel

## 2020-08-05 NOTE — HOME CARE LIAISON
Received referral from Joshua. Spoke with patient's wife and provided choice. Patient's wife is agreeable to [facility]. Residential provided contact information. All questions addressed and answered.

## 2020-08-05 NOTE — PHYSICAL THERAPY NOTE
PHYSICAL THERAPY EVALUATION - INPATIENT     Room Number: 336/336-A  Evaluation Date: 8/5/2020  Type of Evaluation: Initial   Physician Order: PT Eval and Treat    Presenting Problem: epistaxis  Reason for Therapy: Mobility Dysfunction and Discharge Planni of seated rest break with LE's elevated BP: 120/53mmHg-- RN made aware. Patient in chair at end of session with needs in reach- chair alarm activated.      The patient's Approx Degree of Impairment: 46.58% has been calculated based on documentation in the assists with driving, meal prep, cleaning    SUBJECTIVE  \"at the other hospital I was in bed for 3 week- they didn't even use the tree to get me out of bed! \"    PHYSICAL THERAPY EXAMINATION     OBJECTIVE  Precautions: Bed/chair alarm  Fall Risk: Standard -   Moving from lying on back to sitting on the side of the bed?: A Little   How much help from another person does the patient currently need. ..   -   Moving to and from a bed to a chair (including a wheelchair)?: A Little   -   Need to walk in hospital

## 2020-08-17 PROBLEM — N17.9 AKI (ACUTE KIDNEY INJURY) (HCC): Status: ACTIVE | Noted: 2020-01-01

## 2020-08-17 PROBLEM — A41.9 SEPSIS, DUE TO UNSPECIFIED ORGANISM, UNSPECIFIED WHETHER ACUTE ORGAN DYSFUNCTION PRESENT (HCC): Status: ACTIVE | Noted: 2020-01-01

## 2020-08-17 PROBLEM — N30.00 ACUTE CYSTITIS WITHOUT HEMATURIA: Status: ACTIVE | Noted: 2020-01-01

## 2020-08-17 PROBLEM — E87.5 HYPERKALEMIA: Status: ACTIVE | Noted: 2020-01-01

## 2020-08-17 NOTE — PROGRESS NOTES
ADMISSION NOTE  Late entry  Pt seen earlier in the morining  81year old malewithh/ DM HTN HL Afib/flutter,recent admission for severe epistaxis presents with lethargy weakness and UTI with sepsis and hypotension .  Available medical records partially revie

## 2020-08-17 NOTE — PROGRESS NOTES
RRT    *See RRT Documentation Record*    Reason the RRT was called: Decreasing Spo2, unresponsive to sternal rub and circumoral cyanosis  Assessment of patient leading up to RRT: Patient confused, but he would open his eyes to answer simple questions.  He w

## 2020-08-17 NOTE — H&P
[facility]    PATIENT'S NAME: Adelita Littlejohn   ATTENDING PHYSICIAN: Js Butler MD   PATIENT ACCOUNT#:   [de-identified]    LOCATION:  [address] [place] [address] #:   [id number]       YOB: 1935  ADMISSION DATE:       08/17/2020    HIS not smoke. He was a previous smoker and quit in 1988. He drinks alcohol rarely. He does not use drugs. Worked as a . REVIEW OF SYSTEMS:  Twelve systems were reviewed. The patient reports that his appetite has been good.   He has felt was no focal weakness. PSYCHIATRIC:  His mood and affect were pleasant and cooperative. BACK:  There was no costovertebral angle tenderness noted.     LABORATORY DATA:  Glucose 131, sodium 140, potassium 5.6, chloride 112, CO2 of 22, BUN of 43 with a crea take p.o., continue finasteride. 11.   Gastroesophageal reflux disease. We will place on Protonix. 12.   The patient's current clinical status and proposed treatment plan was discussed with him. All of his questions were answered.   He agreed with the p

## 2020-08-17 NOTE — PROGRESS NOTES
[facility] Pharmacy Note:  Renal Adjustment for piperacillin/tazobactam (Alessia Medina)    Joice Curling is a 81year old patient who has been prescribed piperacillin/tazobactam (ZOSYN) 3.375g every 8 hrs.   CrCl is estimated creatinine clearance is 19 mL/min (A) (based o

## 2020-08-17 NOTE — SIGNIFICANT EVENT
[facility]  Rapid Response Note     Parmjit Shafer  : 1935        Reason for Rapid Response     Patient non-responsive, hypoxemia, hypotension     Assessment and Plan     84M with a history of DM, neuropathy, HL, HTN.  Admitted overni NEPRELIM 6.93     Recent Labs   Lab 08/17/20  0034   BUN 43*   CREATSERUM 2.49*   GFRAA 27*   GFRNAA 23*   CA 7.9*      K 5.6*      CO2 22.0   *   PTT 34.9   INR 1.67*       Xr Abdomen, Obstructive Series 3 Views(cpt=74021)    Result D

## 2020-08-17 NOTE — CONSULTS
Pulmonary/Critical Care Consultation Note    HPI:   Shae Mcduffie is a 81year old male with Patient presents with:  Altered Mental Status    Ashley Musa    Pt is a 80 yo with obesity, DM, HTN, BPH, PAF and recetn admission for epistaxis who presented Oral, Q15 Min PRN    Or  Glucose-Vitamin C (DEX-4) chewable tab 8 tablet, 8 tablet, Oral, Q15 Min PRN  Insulin Aspart Pen (NOVOLOG) 100 UNIT/ML flexpen 1-5 Units, 1-5 Units, Subcutaneous, TID CC  Pantoprazole Sodium (PROTONIX) 40 mg in Sodium Chloride 0.9 107 08/17/2020    BILT 0.5 08/17/2020    TP 7.4 08/17/2020     08/17/2020     08/17/2020    PTT 34.9 08/17/2020    INR 1.67 08/17/2020    TROP <0.045 08/17/2020     CXR clear    Lactates now < 2         ASSESSMENT/PLAN:     Change in MS  Unre

## 2020-08-17 NOTE — PROGRESS NOTES
Post midnight follow up note. Patient was seen and examined. Lethargic. Sternal rub to arouse. States he doesn't feel well but unable to elaborate.    Lactic acid now 1.7  Blood culture and urine culture pending   Creatinine rising  Insert craig catheter

## 2020-08-17 NOTE — OCCUPATIONAL THERAPY NOTE
Orders received, chart reviewed for OT evaluation. Discussed with RN who reported that RRT was called this PM --pt was non-responsive, hypoxic, hypotensive. Pt transferring to CCU. Will follow-up tomorrow as pt is appropriate.

## 2020-08-17 NOTE — ED PROVIDER NOTES
Patient Seen in: [facility] Emergency Department      History   Patient presents with:  Altered Mental Status    Stated Complaint:     HPI    45-year-old male with past medical history significant for diabetes, high blood pressure, high cholestero no exudates  Eyes: Conjunctivae and EOM are normal. PERRLA  Neck: Normal range of motion. Neck supple. No tracheal deviation present. CV: s1s2+, RRR, no m/r/g, normal distal pulses  Pulmonary/Chest: CTA b/l with no rales, wheezes.   No chest wall tenderne (*)     RDW-SD 61.6 (*)     RDW 17.3 (*)     Lymphocyte Absolute 0.82 (*)     All other components within normal limits   CBC WITH DIFFERENTIAL WITH PLATELET    Narrative:      The following orders were created for panel order CBC WITH DIFFERENTIAL WITH YUNIER Cece [address]  [phone] - Phone    [facility]    NAME: Milton Lopez OF EXAM: 08/17/2020  Patient No:  [id number]  Physician:  Jenni Reddy  YOB: 1935    Past Medical History (entered by Technologist):    Reason For Exam (en AM    Cardiac:  Regularity: Regular  Rate: Normal  Heart Sounds: S1,S2    Lungs:   Right: Clear  Left: Clear    Peripheral Pulses:  Radial: Right 1+ or Left 1+      Capillary Refill:  <3 Secs    Skin:  Temp/Moisture: Warm and Dry  Color: Normal      Justin

## 2020-08-17 NOTE — PROGRESS NOTES
PCT called me to tell me that the patient's SpO2 had dropped to 78, that he was unarousable and that another RN was at the bedside. I responded immediately to the bedside and called a rapid response.

## 2020-08-17 NOTE — ED NOTES
Orders for admission, patient is aware of plan and ready to go upstairs. Any questions, please call ED RN Mikhail Siegel at extension [phone]. Patient arrives via EMS from home with c/o increased confusion, decreased ability to ambulate and weakness.  Pt's Vincent

## 2020-08-17 NOTE — SLP NOTE
SLP attempt for swallow evaluation as RN reports patient wet/crackley throat clear following AM meal. Pt now NPO until evaluated by SLP. Pt not able to arouse with maximal verbal/tactile stimulation.  SLP to f/u as patient alert and able to safely participa

## 2020-08-17 NOTE — PROGRESS NOTES
Reported to Dr. Anya Kumar that I made the patient NPO and put in a speech eval per protocol d/t the patient having a wet cough when drinking and crackly throat sounds.

## 2020-08-17 NOTE — ED INITIAL ASSESSMENT (HPI)
Pt brought in by EMS, from home, family reports increased confusion and weakness. Pt currently a/o x4/4 here and able to follow commands, negative [place].

## 2020-08-17 NOTE — CM/SW NOTE
BPCI BERNADINE Proposal:  Met with patient at bedside to explain the BPCI/Medicare program. Patient was enrolled under . BPCI/Medicare Letter provided.     [address] [place] [address]    Ambulatory Status: Caregiver needed    Activities of Daily Crystal

## 2020-08-17 NOTE — SLP NOTE
SLP attempt x2. RRT called this PM and patient transferred to CCU. SLP to f/u as patient alert/appropriate. Thank you.     Wendy Espinoza M.S. [id number] [facility]  Speech Language Pathologist   Phone Number [phone]

## 2020-08-18 NOTE — PROGRESS NOTES
Phone call conversation with Dr. Casey Cabrera and nurse Ramirez Carrion. Overall condition continues to deteriorate. Now on 3 pressors. Had another CODE BLUE. The family is at the bedside and had made him a DO NOT RESUSCITATE.   I had a conversation with the patient's

## 2020-08-18 NOTE — PROGRESS NOTES
[facility]  Nephrology Daily Progress Note    Rosa Maria Platt  [id number]  81year old      HPI:   Rosa Maria Platt is a 81year old male. Above events noted. Pt just intubated and hypotensive. On Levophed. ROS:     Unable.      PHYSICAL 116* 44*   BUN 45* 47* 54*   CREATSERUM 2.75* 2.77* 3.12*   GFRAA 24* 23* 20*   GFRNAA 20* 20* 18*   CA 7.7* 8.0* 7.7*   ALB 3.0* 2.8* 2.8*    142 142   K 6.3* 6.2* 6.4*   * 115* 114*   CO2 15.0* 18.0* 17.0*   ALKPHO 107  --  133*   *  - hemidiaphragm. 5. Demineralization. 6. Scoliosis. 7. Osteoarthritis. 8. Pacemaker. 9. A preliminary report was submitted and there is agreement without major discrepancies. Dictated by (CST):  Giovanna Veliz MD on 8/17/2020 at 8:30 AM     Finalized by Patient Active Problem List:     Open wound of left foot excluding one or more toes     Ulcer of right heel, limited to breakdown of skin (HCC)     Anemia     Hyperglycemia     Acute posterior epistaxis     Acute cystitis without hematuria     Sepsis, du

## 2020-08-18 NOTE — OCCUPATIONAL THERAPY NOTE
Occupational therapy orders received, chart review complete - pt had code blue this AM 2/2 loss of pulse, now intubated and on pressers 2/2 low BP.  Will d/c orders at this time and place on hold, will need updated orders when pt is medically appropriate fo

## 2020-08-18 NOTE — PROGRESS NOTES
[facility] Pharmacy Note:  Renal Adjustment for meropenem (MERREM)    Brock Irizarry is a 81year old patient who has been prescribed meropenem (MERREM) 500 mg every 8 hrs.   CrCl is estimated creatinine clearance is 16.8 mL/min (A) (based on SCr of 3.12 mg/dL (H))

## 2020-08-18 NOTE — PROGRESS NOTES
Procedure note : Endotracheal Intubation     Indication : Patient with acute respiratory failure, CODE BLUE  Direct visualization of vocal cord by using glide scope  7.5 ET tube was inserted without difficulty  End-tidal CO2 turned to yellow  Good air exch

## 2020-08-18 NOTE — RESPIRATORY THERAPY NOTE
PT was a code blue this morning, was intubated by MD with a 7.5 ETT secured at 24. Vent settings are as follows; AC 14 500 100% +5. PT remains in critical conditions, no changes made. RT to continue to monitor.

## 2020-08-18 NOTE — PROGRESS NOTES
1330: notified dr. Mayes Like for change pt, on max dose vasopressin, levophed, pt bp decreaseing, code blue called when no pulse. recessitated pt on one epi, dopamin gtt, cpr. Labs reviewed with dr. Nikos Ortega. Albumin 250xFamily at bedside. Remain fully vented.

## 2020-08-18 NOTE — PROGRESS NOTES
[facility]    Progress Note    Chester Juniper Galvan Patient Status:  Inpatient    1935 MRN [id number]   Location [facility] 2W/SW Attending Jovana Mancia MD   Hosp Day # 1 PCP MARCO AGRAWAL        Subjective:     Unable to per sepsis and ?  DIC  7– CHF and history of pacer ( prior LVEF 45 % )   8-  Toxic and metabolic encephalopathy   9- DM , HTN , Obesity       Condition critical , complex with high possibility for further decompensation    Plan :   Supportive care  Vent managem significant pneumoperitoneum. 3. Moderate gastric distention. ?  Gastroparesis    Dictated by (CST): Willis Pineda MD on 8/17/2020 at 8:16 AM     Finalized by (CST): Willis Pineda MD on 8/17/2020 at 8:21 AM          Xr Chest Ap Portable  (cpt=7 Demineralization. 6. Scoliosis. 7. Osteoarthritis. 8. Pacemaker. 9. A preliminary report was submitted and there is agreement without major discrepancies. Dictated by (CST): Tuan Pena MD on 8/17/2020 at 8:30 AM     Finalized by (CST):  Zehra Chacon,

## 2020-08-18 NOTE — PLAN OF CARE
Pt lethargic at 0815am,  notified, will see patient soon.  Patient was scheduled to get oral meds for elevated potassium, attempted to insert once when pt rolled his eyes back and lost his pulse, code blue was callled at 828 am, see the code blue

## 2020-08-18 NOTE — CONSULTS
[facility]    PATIENT'S NAME: Stacy WINSLOW [place]   ATTENDING PHYSICIAN: Frannie Harvey MD   CONSULTING PHYSICIAN: Soraya Vargas MD   PATIENT ACCOUNT#:   [de-identified]    LOCATION:  [address] [place] [address]  MEDICAL RECORD #:   [id number]       DATE OF BIRTH:  [birth year] of BPH and recurrent urinary tract infections.   The patient has much of his medical care at the [address]:  His medications at time of admission included metoprolol, simvastatin, Proscar, Lyrica, potassium chloride, furosemide, metformi His white count was 8.9, hemoglobin 8.7, hematocrit 29.3. Urinalysis shows 100 mg/dL protein, large leukocytes, 180 WBCs, 80 RBCs, and moderate bacteria. Blood and urine cultures are pending. Rapid COVID was negative.     IMPRESSION:  The patient is

## 2020-08-18 NOTE — PROGRESS NOTES
provided support to family at bedside of patient  Patient was non responsive and family tearful. Request was made for [place] [address]  visit for  [address]  Family expressed fear and   sadness,  offered empathic listening   and emo

## 2020-08-18 NOTE — PLAN OF CARE
Problem: Diabetes/Glucose Control  Goal: Glucose maintained within prescribed range  Description  INTERVENTIONS:  - Monitor Blood Glucose as ordered  - Assess for signs and symptoms of hyperglycemia and hypoglycemia  - Administer ordered medications to m sight of RN station     Problem: GENITOURINARY - ADULT  Goal: Absence of urinary retention  Description  INTERVENTIONS:  - Assess patient’s ability to void and empty bladder  - Monitor intake/output and perform bladder scan as needed  - Follow urinary rete Not Progressing     Problem: Delirium  Goal: Minimize duration of delirium  Description  Interventions:  - Encourage use of hearing aids, eye glasses  - Promote highest level of mobility daily  - Provide frequent reorientation  - Promote wakefulness i.e. l

## 2020-08-18 NOTE — CONSULTS
[facility] ID CONSULT NOTE    Yamilka Kelly Patient Status:  Inpatient    1935 MRN [id number]   Location [facility] 2W/SW Attending Shereen Mahmood MD   Hosp Day # 1 PCP MARCO AGRAWAL       Reason for Consultation: Allergies    Medications:    Current Facility-Administered Medications:   •  propofol (DIPRIVAN) 10 MG/ML injection, , ,   •  norepinephrine (LEVOPHED) 4 mg/250 ml premix infusion, , ,   •  Meropenem (MERREM) 500 mg in sodium chloride 0.9% 100 mL MBP, 500 sedated  HEENT: ETT in place  Neck: No lymphadenopathy. Supple.   Cardiovascular: RRR  Respiratory: Clear to auscultation bilaterally  Abdomen: Soft, distended, edematous  Musculoskeletal: cool distal extremities   Integument: healed ulcers on toes  RIJ CV called. Patient intubated and central line placed. Had PEA arrest.  Antibiotics broadened to IV meropenem and IV vancomycin. Repeat blood culture sent and pending. Admission blood cultures negative with urine culture showing E. Coli.  Started on vasopre

## 2020-08-18 NOTE — CONSULTS
[facility] - [facility]    Cardiology Consultation  [facility]    Romana Zavala Galvan Patient Status:  Inpatient    1935 MRN [id number]   Location [facility] 2W/SW Attending Huma Lindsay, [address] [facility] Day # 1 PCP MARCO YEH History Narrative    None on file            Current Medications:  propofol (DIPRIVAN) 10 MG/ML injection, , ,   norepinephrine (LEVOPHED) 4 mg/250 ml premix infusion, , ,   Albumin Human (ALBUMINAR) 5 % solution 250 mL, 250 mL, Intravenous, Once  calcium 10 mEq by mouth daily. furosemide 20 MG Oral Tab, Take 20 mg by mouth daily. metFORMIN HCl 1000 MG Oral Tab, Take 1,000 mg by mouth daily with breakfast.  omeprazole 20 MG Oral Capsule Delayed Release, Take 20 mg by mouth every morning.   topiramate 25 MG °F (35.9 °C) Temporal 61 12 99 % —   08/17/20 1500 93/50 — — 62 13 99 % —   08/17/20 1318 104/61 — — — — 100 % —   08/17/20 1310 — — — 59 — 100 % —   08/17/20 1306 120/64 — — 59 14 98 % —   08/17/20 1302 (!) 87/20 — — 61 14 97 % —   08/17/20 1258 — — — — 1 • meropenem  500 mg Intravenous Q12H   • iron sucrose  200 mg Intravenous Daily   • vancomycin  25 mg/kg Intravenous Once   • vancomycin  1 mg Intravenous See Admin Instructions (RX holding)   • Insulin Aspart Pen  1-5 Units Subcutaneous TID CC   • panto RDW 17.3* 17.4*   NEPRELIM 6.93 18.49*   WBC 8.9 21.9*   .0 246.0       No results for input(s): BNPML in the last 168 hours.     Recent Labs   Lab 08/17/20  1314   TROP <0.045       Imaging:  Xr Chest Ap Portable  (cpt=71045)    Result Date: 8/18/

## 2020-08-18 NOTE — PROGRESS NOTES
Procedure note : Right IJ central line placement  Indication : Patient with shock and multiple organ system failure  Right IJ area was cleaned and sterilized with ChloraPrep  Under complete sterile fashion procedure was performed  By utilizing real-time ul

## 2020-08-18 NOTE — PROGRESS NOTES
Dr. Cheyenne Cooley notified of critical; lab from 0930 labs. Orders received. Pt status updated. Pt on 15mcg of levophed, no urine noted. Remain on full vent support. Family at bedside. ongoning emotional support.  Pt seen per  and .

## 2020-08-18 NOTE — PROGRESS NOTES
Pt's family at bedside. Request for terminal wean. Provided emotional support. . Bladimir Rodriguez paged and spoke with family.  Pt's wife states \"I don't want him dying on his birrthday tomorrow\", Rn discussed pt's vitals stable on three pressors despite no urin

## 2020-08-18 NOTE — SLP NOTE
SLP attempt for ongoing swallow evaluation. Pt now intubated. SLP to place patient \"ON HOLD\" and new MD swallow evaluation orders to be obtained as patient appropriate. Thank you.     Rodolfo Bautista M.S. [id number] [facility]  Speech Language Pathologist   Phone

## 2020-08-18 NOTE — PROGRESS NOTES
Family spoke to dr. Royal Hernandez regarding pt state and treatment options. Family DO Not Want CRRT. Pt is not dnar/full treatment. No cpr, no defibrilation. [facility] notified. Pt is not a candidate.  Ref #[id number]

## 2020-08-18 NOTE — PHYSICAL THERAPY NOTE
Orders received for PT eval. Pt had CODE BLUE today (PEA arrest, CPR). Pt is now intubated. Pt will be placed ON HOLD. Please re-order physical therapy when pt is medically stable to participate w/ PT.

## 2020-08-18 NOTE — PROGRESS NOTES
[facility] Dosing Service    Initial Pharmacokinetic Consult for Vancomycin Dosing     Brock Irizarry is a 81year old patient who is being treated for sepsis. Pharmacy has been asked to dose Vancomycin by Dr. Aleksandra Arizmendi    Patient has no known allergies.

## 2020-08-18 NOTE — PLAN OF CARE
Problem: Safety Risk - Non-Violent Restraints  Goal: Patient will remain free from self-harm  Description  INTERVENTIONS:  - Apply the least restrictive restraint to prevent harm  - Notify patient and family of reasons restraints applied  - Assess for an Goal  Description  Patient's Short Term Goal: pt unable to verbalize    Interventions:     - See additional Care Plan goals for specific interventions   Outcome: Not Progressing     Problem: PAIN - ADULT  Goal: Verbalizes/displays adequate comfort level or bladder emptying  Outcome: Not Progressing     Problem: SKIN/TISSUE INTEGRITY - ADULT  Goal: Skin integrity remains intact  Description  INTERVENTIONS  - Assess and document risk factors for pressure ulcer development  - Assess and document skin integrity of hearing aids, eye glasses  - Promote highest level of mobility daily  - Provide frequent reorientation  - Promote wakefulness i.e. lights on, blinds open  - Promote sleep, encourage patient's normal rest cycle i.e. lights off, TV off, minimize noise and

## 2020-08-19 LAB — BETA-HYDROXYBUTYRIC ACID: 1.9 MG/DL

## 2020-08-19 NOTE — PLAN OF CARE
Jaziel Postal, pt's daughter notified of [employer] elastic watch found in pt's closet. Per daughter and family, they wish for it to be with the  and sent to the  home at the time of the departed's transport.       Evelia Gallego with spiritual care also no

## 2020-08-19 NOTE — PLAN OF CARE
PT pronounced by Kalin Keating RN and Malinda Jones RN. No spontaneous breaths, no pulses palpable or by doppler, no chest rise and fall. No audible heart tones, asystole on monitor. Family at bedside.  notified.

## 2020-08-19 NOTE — PLAN OF CARE
06:16 Dr. Galina Freed (cards) updated on pt's TOD  06:14 Dr. Noelle Puckett (ID)updated with pt's TOD

## 2020-08-19 NOTE — PLAN OF CARE
20:15 Dr. Merlin Gibbs notified of TOD   20:19 Hailey Ortiz notified of TOD  20:31 Dr. Sancho Gu notified of TOD  20:34 Dr. Malcom Opitz notified of TOD  70:38 Public Safety notified of TOD  20:41 Pasha CASON notified of TOD

## 2020-08-20 ENCOUNTER — TELEPHONE (OUTPATIENT)
Dept: PULMONOLOGY | Facility: CLINIC | Age: 85
End: 2020-08-20

## 2020-08-20 NOTE — TELEPHONE ENCOUNTER
Completed death cert was faxed to the state at #[phone]. Confirmation lynvdMic Cheung at [facility], [facility]. AND [facility] was informed of the previous.

## 2020-12-01 NOTE — DISCHARGE SUMMARY
[facility]    Discharge Summary    Manisha Carr Galvan Patient Status:  Inpatient    1935 MRN [id number]   Location [facility] 2W/SW Attending No att. providers found   Hosp Day # 1 PCP MARCO AGRAWAL     Date of Admission:  and acidemia  Please refer to the initial ICU consult    Patient had CODE BLUE on 8/18/2020 with asystole and PEA arrest  Patient was intubated and placed on mechanical ventilation  Central line placed  Supportive care on a ventilator with a broad spectrum

## 2021-02-04 NOTE — PLAN OF CARE
Per Gordo Macario RN and family pt is comfort measures. As requested morphine gtt and propofol infusing for sedation and comfort. All pressors off. Pt remains on vent at 100% fio2. Family at bedside, AV pacer deactivated with magnet. Family in agreement with POC. Yes

## 2024-08-27 NOTE — PROGRESS NOTES
Alerted LAKSHMI and Dr. Ruperto Kawasaki that ABG results were back. No new orders at this time. LAKSHMI said she would continue to follow the patient and check back on him this afternoon. 99

## (undated) DEVICE — PACKING 400406 10PK POPE EPISTAXIS: Brand: MEROCEL®

## (undated) DEVICE — FRAZIER SUCTION INSTRUMENT 8 FR W/CONTROL VENT & OBTURATOR: Brand: FRAZIER

## (undated) DEVICE — INSULATED BLADE ELECTRODE 6.5

## (undated) DEVICE — SOL  .9 1000ML BTL

## (undated) DEVICE — NASAL ACCESSORY: Brand: MEDLINE INDUSTRIES, INC.

## (undated) DEVICE — SPNG ABS THK10MM 100 12.5X8CM

## (undated) DEVICE — 25GA BIPOLAR CAUTERY: Brand: SYNERGETICS

## (undated) DEVICE — EYE PADSSTERILENOT MADE WITH NATURAL RUBBER LATEXSINGLE USE ONLYDO NOT USE IF PACKAGE OPENED OR DAMAGED: Brand: CARDINAL HEALTH

## (undated) DEVICE — SCD SLEEVE KNEE HI BLEND

## (undated) DEVICE — ENCORE® LATEX ACCLAIM SIZE 6, STERILE LATEX POWDER-FREE SURGICAL GLOVE: Brand: ENCORE

## (undated) DEVICE — HEAD & NECK: Brand: MEDLINE INDUSTRIES, INC.

## (undated) DEVICE — ELECTROSURGICAL SUCTION COAGULATOR, 10FR

## (undated) NOTE — ED AVS SNAPSHOT
Bartolo Chau   MRN: [id number]    Department:  [facility] Emergency Department   Date of Visit:  10/27/2018           Disclosure     Insurance plans vary and the physician(s) referred by the ER may not be covered by your plan.  Please contact y within the next three months to obtain basic health screening including reassessment of your blood pressure.     IF THERE IS ANY CHANGE OR WORSENING OF YOUR CONDITION, CALL YOUR PRIMARY CARE PHYSICIAN AT ONCE OR RETURN IMMEDIATELY TO THE EMERGENCY DEPARTMEN